# Patient Record
Sex: FEMALE | Race: WHITE | Employment: UNEMPLOYED | URBAN - METROPOLITAN AREA
[De-identification: names, ages, dates, MRNs, and addresses within clinical notes are randomized per-mention and may not be internally consistent; named-entity substitution may affect disease eponyms.]

---

## 2017-03-20 PROBLEM — R56.9 SEIZURE (HCC): Status: ACTIVE | Noted: 2017-03-20

## 2017-03-22 PROBLEM — I25.10 CAD (CORONARY ARTERY DISEASE): Status: ACTIVE | Noted: 2017-03-22

## 2019-07-29 ENCOUNTER — OFFICE VISIT (OUTPATIENT)
Dept: FAMILY MEDICINE CLINIC | Age: 54
End: 2019-07-29

## 2019-07-29 VITALS
SYSTOLIC BLOOD PRESSURE: 133 MMHG | HEART RATE: 72 BPM | RESPIRATION RATE: 20 BRPM | TEMPERATURE: 98.3 F | OXYGEN SATURATION: 93 % | HEIGHT: 64 IN | DIASTOLIC BLOOD PRESSURE: 80 MMHG | BODY MASS INDEX: 27.66 KG/M2 | WEIGHT: 162 LBS

## 2019-07-29 DIAGNOSIS — M54.31 SCIATICA OF RIGHT SIDE: Primary | ICD-10-CM

## 2019-07-29 DIAGNOSIS — E04.1 THYROID NODULE: ICD-10-CM

## 2019-07-29 DIAGNOSIS — M54.41 CHRONIC MIDLINE LOW BACK PAIN WITH RIGHT-SIDED SCIATICA: ICD-10-CM

## 2019-07-29 DIAGNOSIS — G89.29 CHRONIC MIDLINE LOW BACK PAIN WITH RIGHT-SIDED SCIATICA: ICD-10-CM

## 2019-07-29 PROBLEM — J41.0 SIMPLE CHRONIC BRONCHITIS (HCC): Status: ACTIVE | Noted: 2019-07-29

## 2019-07-29 RX ORDER — LIDOCAINE 50 MG/G
PATCH TOPICAL EVERY 24 HOURS
COMMUNITY
End: 2019-09-15

## 2019-07-29 RX ORDER — GABAPENTIN 100 MG/1
CAPSULE ORAL DAILY
COMMUNITY
End: 2020-02-21 | Stop reason: SDUPTHER

## 2019-07-29 RX ORDER — IBUPROFEN 800 MG/1
TABLET ORAL
COMMUNITY
End: 2020-02-21

## 2019-07-29 RX ORDER — CARBAMAZEPINE 200 MG/1
200 TABLET ORAL 3 TIMES DAILY
COMMUNITY
End: 2019-09-15

## 2019-07-29 NOTE — PROGRESS NOTES
Tildon Severin is a 46 y.o. female (: 1965) presenting to address:    Chief Complaint   Patient presents with   [facility] Patient    Back Pain     pain scale 5/10       Vitals:    19 1345   BP: 133/80   Pulse: 72   Resp: 20   Temp: 98.3 °F (36.8 °C)   TempSrc: Oral   SpO2: 93%   Weight: 162 lb (73.5 kg)   Height: 5' 4.17\" (1.63 m)   PainSc:   5   PainLoc: Back       Hearing/Vision:   No exam data present    Learning Assessment:     Learning Assessment 2019   PRIMARY LEARNER Patient   HIGHEST LEVEL OF EDUCATION - PRIMARY LEARNER  DID NOT GRADUATE HIGH SCHOOL   BARRIERS PRIMARY LEARNER NONE   CO-LEARNER CAREGIVER No   PRIMARY LANGUAGE ENGLISH   LEARNER PREFERENCE PRIMARY READING   ANSWERED BY patient    RELATIONSHIP SELF     Depression Screening:     3 most recent PHQ Screens 2019   Little interest or pleasure in doing things Several days   Feeling down, depressed, irritable, or hopeless Several days   Total Score PHQ 2 2     Fall Risk Assessment:   No flowsheet data found. Abuse Screening:     Abuse Screening Questionnaire 2019   Do you ever feel afraid of your partner? N   Are you in a relationship with someone who physically or mentally threatens you? N   Is it safe for you to go home? Y     Coordination of Care Questionaire:   1. Have you been to the ER, urgent care clinic since your last visit? Hospitalized since your last visit? NO    2. Have you seen or consulted any other health care providers outside of the [address] [place] [address] since your last visit? Include any pap smears or colon screening. NO    Advanced Directive:   1. Do you have an Advanced Directive? NO    2. Would you like information on Advanced Directives?  NO

## 2019-08-08 NOTE — PATIENT INSTRUCTIONS
Referral to PT, and orthopedics for back issues ongoing. Referral to cardiology for ongoing thyroid nodule.

## 2019-08-08 NOTE — PROGRESS NOTES
Julia Clay is a 46 y.o.  female and presents with    Chief Complaint   Patient presents with    New Patient    Back Pain     pain scale 5/10         Subjective:  Patient presents to establish care. Patient states come of ongoing back pain times several years. Rates pain 5 out of 10 on pain scale. From the episode in the past of right-sided back pain associated with sciatica. Patient on Neurontin, Lidoderm patches, and Motrin with some success. Patient here today requesting move orthopedics physical therapy. Other concerns today include thyroid nodule, patient would like endocrinology referral for this. States previously seen at care clinic and to speak, and had work-up with endocrinology and treatment but now that she has insurance referral for further treatment. Current Outpatient Medications   Medication Sig Dispense Refill    gabapentin (NEURONTIN) 100 mg capsule Take  by mouth three (3) times daily.  carBAMazepine (TEGRETOL) 200 mg tablet Take 200 mg by mouth three (3) times daily.  lidocaine (LIDODERM) 5 % by TransDERmal route every twenty-four (24) hours. Apply patch to the affected area for 12 hours a day and remove for 12 hours a day.  ibuprofen (MOTRIN) 800 mg tablet Take  by mouth.        Allergies   Allergen Reactions    Adhesive Itching    Iodinated Contrast Media Other (comments)    Pcn [Penicillins] Hives     Past Medical History:   Diagnosis Date    Celiac disease     Pars defect      Past Surgical History:   Procedure Laterality Date    HX APPENDECTOMY      HX  SECTION      HX HYSTERECTOMY       Family History   Problem Relation Age of Onset    Cancer Mother     Diabetes Mother     Hypertension Mother     Diabetes Father     Hypertension Father      Social History     Tobacco Use    Smoking status: Current Every Day Smoker    Smokeless tobacco: Never Used   Substance Use Topics    Alcohol use: Never     Frequency: Never       Review of Systems Constitutional: Negative for chills and fever. HENT: Negative. Eyes: Negative for blurred vision. Respiratory: Negative for shortness of breath. Cardiovascular: Negative for chest pain and leg swelling. Gastrointestinal: Negative for abdominal pain, constipation, diarrhea, nausea and vomiting. Genitourinary: Negative. Musculoskeletal: Positive for back pain and myalgias. Skin: Negative. Neurological: Negative for headaches. Psychiatric/Behavioral: Negative. Objective:  Vitals:    07/29/19 1345   BP: 133/80   Pulse: 72   Resp: 20   Temp: 98.3 °F (36.8 °C)   TempSrc: Oral   SpO2: 93%   Weight: 162 lb (73.5 kg)   Height: 5' 4.17\" (1.63 m)   PainSc:   5   PainLoc: Back     General:   Well-groomed, well-nourished, in no distress, pleasant, alert, appropriate    Neck:      Neck supple, no swelling, mass or tenderness or thyromegaly noted on exam. Unable to palpate neck mass. Cardiovasc:   RRR,  no murmur, rubs or gallops. Pulmonary:    Lungs clear bilaterally, no wheezing, rales or rhonchi. Abdomen:   Abdomen soft, normal bowel sounds. No masses, tenderness,    rebound/rigidity or CVA tenderness. No hepatosplenomegaly. Assessment/Plan:      1. Sciatica of right side  - REFERRAL TO PHYSICAL THERAPY  - REFERRAL TO ORTHOPEDICS    2. Chronic midline low back pain with right-sided sciatica  - REFERRAL TO PHYSICAL THERAPY  - REFERRAL TO ORTHOPEDICS    3. Thyroid nodule  - REFERRAL TO ENDOCRINOLOGY    I have discussed the diagnosis with the patient and the intended plan as seen in the above orders. The patient has received an after-visit summary and questions were answered concerning future plans. I have discussed medication side effects and warnings with the patient as well. I have reviewed the plan of care with the patient, accepted their input and they are in agreement with the treatment goals. Follow-up and Dispositions    · Return in about 3 months (around 10/29/2019). More than 1/2 of this 30 minute visit was spent in counselling and coordination of care, as described above.     Karo Bowden FNP-BC

## 2019-09-18 ENCOUNTER — DOCUMENTATION ONLY (OUTPATIENT)
Dept: FAMILY MEDICINE CLINIC | Age: 54
End: 2019-09-18

## 2019-09-18 ENCOUNTER — OFFICE VISIT (OUTPATIENT)
Dept: FAMILY MEDICINE CLINIC | Age: 54
End: 2019-09-18

## 2019-09-18 VITALS
HEART RATE: 65 BPM | SYSTOLIC BLOOD PRESSURE: 151 MMHG | OXYGEN SATURATION: 97 % | WEIGHT: 154 LBS | DIASTOLIC BLOOD PRESSURE: 85 MMHG | HEIGHT: 65 IN | BODY MASS INDEX: 25.66 KG/M2 | TEMPERATURE: 97.8 F | RESPIRATION RATE: 22 BRPM

## 2019-09-18 DIAGNOSIS — I25.10 CORONARY ARTERY DISEASE INVOLVING NATIVE HEART WITHOUT ANGINA PECTORIS, UNSPECIFIED VESSEL OR LESION TYPE: Primary | ICD-10-CM

## 2019-09-18 DIAGNOSIS — Z72.0 TOBACCO ABUSE: ICD-10-CM

## 2019-09-18 DIAGNOSIS — J44.1 CHRONIC OBSTRUCTIVE PULMONARY DISEASE WITH ACUTE EXACERBATION (HCC): ICD-10-CM

## 2019-09-18 DIAGNOSIS — R07.1 CHEST PAIN ON BREATHING: ICD-10-CM

## 2019-09-18 DIAGNOSIS — M54.2 NECK PAIN: ICD-10-CM

## 2019-09-18 RX ORDER — FLUTICASONE PROPIONATE AND SALMETEROL 250; 50 UG/1; UG/1
1 POWDER RESPIRATORY (INHALATION) EVERY 12 HOURS
Qty: 3 INHALER | Refills: 3 | Status: SHIPPED | OUTPATIENT
Start: 2019-09-18 | End: 2020-02-21

## 2019-09-18 NOTE — PATIENT INSTRUCTIONS
Start taking Robaxin as prescribed by emergency department. Make appointment for vascular as requested by emergency department. Will refer to pulmonology.   See back as needed and in 3 months for touch base

## 2019-09-18 NOTE — PROGRESS NOTES
Robin Sanford is a 46 y.o. female (: 1965) presenting to address:    Chief Complaint   Patient presents with    Follow-up    ED Follow-up     patient went to ER on . patient c/o chest pain and discomfort  she stated she does feel sharp pain ever so often    patient         Vitals:    19 1023   BP: 151/85   Pulse: 65   Resp: 22   Temp: 97.8 °F (36.6 °C)   TempSrc: Oral   SpO2: 97%   Weight: 154 lb (69.9 kg)   Height: 5' 5\" (1.651 m)   PainSc:   6   PainLoc: Generalized       Hearing/Vision:   No exam data present    Learning Assessment:     Learning Assessment 2019   PRIMARY LEARNER Patient   HIGHEST LEVEL OF EDUCATION - PRIMARY LEARNER  DID NOT GRADUATE HIGH SCHOOL   BARRIERS PRIMARY LEARNER NONE   CO-LEARNER CAREGIVER No   PRIMARY LANGUAGE ENGLISH   LEARNER PREFERENCE PRIMARY READING   ANSWERED BY patient    RELATIONSHIP SELF     Depression Screening:     3 most recent PHQ Screens 2019   Little interest or pleasure in doing things Not at all   Feeling down, depressed, irritable, or hopeless Several days   Total Score PHQ 2 1     Fall Risk Assessment:   No flowsheet data found. Abuse Screening:     Abuse Screening Questionnaire 2019   Do you ever feel afraid of your partner? N   Are you in a relationship with someone who physically or mentally threatens you? N   Is it safe for you to go home? Y     Coordination of Care Questionaire:   1. Have you been to the ER, urgent care clinic since your last visit? Hospitalized since your last visit? YES [facility]    2. Have you seen or consulted any other health care providers outside of the [address] [place] [address] since your last visit? Include any pap smears or colon screening. NO    Advanced Directive:   1. Do you have an Advanced Directive? NO    2. Would you like information on Advanced Directives?  NO

## 2019-09-27 NOTE — PROGRESS NOTES
Christa Saeed is a 46 y.o.  female and presents with    Chief Complaint   Patient presents with    Follow-up    ED Follow-up     patient went to ER on Sunday. patient c/o chest pain and discomfort  she stated she does feel sharp pain ever so often    patient           Subjective:  Patient presents after being seen in the emergency department. Patient according to records left AMA. Patient states that she has had ongoing pressure in her neck, and chest wall for some time it worsened that day. ER wanted to admit patient for observation and patient declined. Initial testing was all within normal limits however emergency department stated due to family history, and current patient condition they wanted to monitor. Patient presents today for follow-up with continued symptoms. She did not fill the Robaxin prescription from the ER, she did not make an appointment with vascular per ER recommendation. She also has questions about seeing a pulmonologist due to her diagnosis of COPD. Current Outpatient Medications   Medication Sig Dispense Refill    fluticasone propion-salmeterol (ADVAIR/WIXELA) 250-50 mcg/dose diskus inhaler Take 1 Puff by inhalation every twelve (12) hours. 3 Inhaler 3    hydrOXYzine HCl (ATARAX) 50 mg tablet Take 50 mg by mouth three (3) times daily as needed for Itching.  lidocaine (LIDODERM) 5 % Apply patch to the affected area for 12 hours a day and remove for 12 hours a day. 30 Patch 0    gabapentin (NEURONTIN) 100 mg capsule Take  by mouth daily.  ibuprofen (MOTRIN) 800 mg tablet Take  by mouth.  albuterol (VENTOLIN HFA) 90 mcg/actuation inhaler Take 2 Puffs by inhalation every four (4) hours as needed for Wheezing. 1 Inhaler 0    methocarbamol (ROBAXIN-750) 750 mg tablet Take 1 Tab by mouth four (4) times daily.  20 Tab 0     Allergies   Allergen Reactions    Iodinated Contrast Media Angioedema    Penicillin G Rash and Nausea Only    Adhesive Itching    Aspirin Other (comments)     Patient denies allergy to aspirin    Codeine Nausea and Vomiting    Iodinated Contrast Media Other (comments)    Iodine Rash    Pcn [Penicillins] Hives    Tylenol [Acetaminophen] Hives and Nausea and Vomiting       Review of Systems   Respiratory: Positive for shortness of breath. Negative for cough, sputum production and wheezing. Cardiovascular: Positive for chest pain. Negative for palpitations and leg swelling. Gastrointestinal: Negative for nausea. Musculoskeletal: Positive for neck pain. Neurological: Negative for dizziness and headaches. Objective:  Vitals:    09/18/19 1023   BP: 151/85   Pulse: 65   Resp: 22   Temp: 97.8 °F (36.6 °C)   TempSrc: Oral   SpO2: 97%   Weight: 154 lb (69.9 kg)   Height: 5' 5\" (1.651 m)   PainSc:   6   PainLoc: Generalized     General:   Well-groomed, well-nourished, in no distress, pleasant, alert, appropriate    Cardiovasc:   RRR,  no murmur, rubs or gallops. Pulmonary:    Lungs clear bilaterally, no wheezing, rales or rhonchi. Assessment/Plan:      1. Coronary artery disease involving native heart without angina pectoris, unspecified vessel or lesion type  Start taking Robaxin as prescribed by emergency department. Make appointment for vascular as requested by emergency department. Will refer to pulmonology. See back as needed and in 3 months for touch base     2. Chronic obstructive pulmonary disease with acute exacerbation (HCC)  - REFERRAL TO PULMONARY DISEASE    3. Chest pain on breathing  - REFERRAL TO PULMONARY DISEASE    4. Neck pain  Robaxin. 5. Tobacco abuse  - REFERRAL TO PULMONARY DISEASE    Advised patient straight back to the emergency department with any changes in condition. Gave specific parameters of shortness of breath, chest pain that is increased or changes, diaphoresis, nausea with vomiting. Patient is agreeable to plan.      I have discussed the diagnosis with the patient and the intended plan as seen in the above orders. The patient has received an after-visit summary and questions were answered concerning future plans. I have discussed medication side effects and warnings with the patient as well. I have reviewed the plan of care with the patient, accepted their input and they are in agreement with the treatment goals. Follow-up and Dispositions    · Return in about 3 months (around 12/18/2019). More than 1/2 of this 25 minute visit was spent face to face in counselling and coordination of care, as described above. This document may have been created with the aid of dictation software. Text may contain errors, particularly phonetic errors.      Giorgio Case [facility]-BC

## 2019-09-30 ENCOUNTER — TELEPHONE (OUTPATIENT)
Dept: FAMILY MEDICINE CLINIC | Age: 54
End: 2019-09-30

## 2019-09-30 NOTE — TELEPHONE ENCOUNTER
Pt says she called and left a message last week on the nurses vm about her albuterol inhaler needing a PA. I do not see any notes in her chart from it and the pt is getting concerned about her medication. Please advise and give pt a call back in regards to the status.

## 2019-10-03 RX ORDER — ALBUTEROL SULFATE 90 UG/1
2 AEROSOL, METERED RESPIRATORY (INHALATION)
Qty: 1 INHALER | Refills: 11 | Status: SHIPPED | OUTPATIENT
Start: 2019-10-03 | End: 2020-02-21 | Stop reason: SDUPTHER

## 2019-10-03 NOTE — TELEPHONE ENCOUNTER
Prior Authorization Request # [id number] for Albuterol was denied  Preferred drugs are Proair HFA, or Proventil HFA

## 2019-10-04 ENCOUNTER — TELEPHONE (OUTPATIENT)
Dept: FAMILY MEDICINE CLINIC | Age: 54
End: 2019-10-04

## 2019-10-04 NOTE — TELEPHONE ENCOUNTER
Patient wanted medication sent to [facility] [place]. I called and left message for refill on pharmacy refill line  I did inform patient as well.

## 2019-10-04 NOTE — TELEPHONE ENCOUNTER
Patient called saying that albuterol inhaler is not covered by insurance but the inhaler that was suppose to be sent to the pharmacy was not sent.  Please advise

## 2020-02-20 NOTE — PROGRESS NOTES
HISTORY OF PRESENT ILLNESS  Mary Britt is a 53 y.o. female. Ms. Patricia Amaya presents to establish care and for follow-up of health problems including multiple cardiac risk factors, COPD, lumbar disc disease with radiculopathy, celiac disease, family history of colon cancer in her mother, nicotine dependence    Mr#: [id number]      Past Medical History:   Diagnosis Date    [facility]     Celiac disease     Chronic obstructive pulmonary disease ([facility]     Colon polyps     Endocrine disease     Heart attack ([facility]     2017    Hypertension     Pars defect     Seizure ([facility]     last seizure 10 years ago per patient.  it was a grand mal seizure    Seizures ([facility]        Past Surgical History:   Procedure Laterality Date    COLONOSCOPY N/A 7/6/2017    COLONOSCOPY, SURVEILLANCE with Bxs performed by Tray Freitas MD at [address] [place] [address] HX APPENDECTOMY       [address] HX COLONOSCOPY      HX ENDOSCOPY      HX HYSTERECTOMY      HX LAPAROTOMY      HX BULMARO AND BSO      HX TONSILLECTOMY         Family History   Problem Relation Age of Onset    Cancer Mother     Diabetes Mother     Hypertension Mother     Colon Cancer Mother     Heart Attack Mother     Diabetes Father     Hypertension Father     Heart Disease Father     Heart Attack Father     Heart Disease Sister     Heart Attack Sister     Diabetes Sister     Diabetes Maternal Grandmother     Heart Attack Maternal Grandmother     Diabetes Maternal Grandfather     Diabetes Paternal Grandmother     Diabetes Paternal Grandfather        Allergies   Allergen Reactions    Iodinated Contrast Media Angioedema    Penicillin G Rash and Nausea Only    Adhesive Itching    Aspirin Other (comments)     Patient denies allergy to aspirin    Codeine Nausea and Vomiting    Iodinated Contrast Media Other (comments)    Iodine Rash    Pcn [Penicillins] Hives    Tylenol [Acetaminophen] Hives and Nausea and Vomiting       Social History     Tobacco Use   Smoking Status Current Every Day Smoker    Packs/day: 1.00    Years: 15.00    Pack years: 15.00   Smokeless Tobacco Never Used       Social History     Substance and Sexual Activity   Alcohol Use Never    Frequency: Never           Health Maintenance Review:  Tetanus immunization - 5-6 years ago per patient  Influenza immunization - declines  Pap smear - N/A  Mammogram - due, will schedule  Colonoscopy - 2 years ago Dr. Thais Cee    Patient Active Problem List   Diagnosis Code    Seizure ([facility] R56.9    CAD (coronary artery disease) I25.10    Simple chronic bronchitis ([facility] J41.0    Thyroid nodule E04.1    Sciatica of right side M54.31    Chronic obstructive pulmonary disease with acute exacerbation (HCC) J44.1    Neck pain M54.2    Tobacco abuse Z72.0         Current Outpatient Medications:     albuterol (PROVENTIL HFA, VENTOLIN HFA, PROAIR HFA) 90 mcg/actuation inhaler, Take 2 Puffs by inhalation every four (4) hours as needed for Wheezing., Disp: 1 Inhaler, Rfl: 11    fluticasone propion-salmeterol (ADVAIR/WIXELA) 250-50 mcg/dose diskus inhaler, Take 1 Puff by inhalation every twelve (12) hours. , Disp: 3 Inhaler, Rfl: 3    hydrOXYzine HCl (ATARAX) 50 mg tablet, Take 50 mg by mouth three (3) times daily as needed for Itching., Disp: , Rfl:     methocarbamol (ROBAXIN-750) 750 mg tablet, Take 1 Tab by mouth four (4) times daily. , Disp: 20 Tab, Rfl: 0    lidocaine (LIDODERM) 5 %, Apply patch to the affected area for 12 hours a day and remove for 12 hours a day., Disp: 30 Patch, Rfl: 0    gabapentin (NEURONTIN) 100 mg capsule, Take  by mouth daily. , Disp: , Rfl:     ibuprofen (MOTRIN) 800 mg tablet, Take  by mouth., Disp: , Rfl:          Review of Systems   Constitutional: Negative for chills, fever and weight loss. HENT: Negative for congestion, hearing loss and sore throat. Eyes: Negative for blurred vision and double vision.         Wears corrective lenses Respiratory: Negative for cough, shortness of breath and wheezing. Sees a pulmonary consultant for COPD, uses Trilegy, albuteral   Cardiovascular: Negative for chest pain, palpitations and leg swelling. Seeing cardiology because of ED visit with chest pain and risk factors   Gastrointestinal: Negative for abdominal pain, constipation, diarrhea, heartburn, nausea and vomiting. Followed for Celiac Dz, Fam Hx colon CA, mother   Genitourinary: Negative for dysuria and urgency. Musculoskeletal: Positive for back pain (lumbar disc disease-advised to use cane, walker). Negative for joint pain and myalgias. Skin: Negative for itching and rash. Lichen planus followed by dermatology   Neurological: Positive for sensory change (numbness right leg) and headaches (sometimes). Negative for dizziness, tingling and focal weakness. Endo/Heme/Allergies: Negative for environmental allergies. Psychiatric/Behavioral: Negative for depression and suicidal ideas. The patient is not nervous/anxious and does not have insomnia. Visit Vitals  /74 (BP 1 Location: Left arm, BP Patient Position: Sitting)   Pulse 70   Temp 97.9 °F (36.6 °C) (Oral)   Resp 15   Ht 5' 5\" (1.651 m)   Wt 156 lb 9.6 oz (71 kg)   SpO2 98%   BMI 26.06 kg/m²       Physical Exam  Vitals signs and nursing note reviewed. Constitutional:       Appearance: Normal appearance. HENT:      Head: Normocephalic. Right Ear: Tympanic membrane, ear canal and external ear normal.      Left Ear: Tympanic membrane, ear canal and external ear normal.      Mouth/Throat:      Mouth: Mucous membranes are moist.      Pharynx: Oropharynx is clear. Eyes:      Extraocular Movements: Extraocular movements intact. Conjunctiva/sclera: Conjunctivae normal.      Pupils: Pupils are equal, round, and reactive to light. Neck:      Musculoskeletal: Neck supple. Vascular: No carotid bruit.    Cardiovascular:      Rate and Rhythm: Normal rate and regular rhythm. Heart sounds: Normal heart sounds. Pulmonary:      Effort: Pulmonary effort is normal.      Breath sounds: Normal breath sounds. Abdominal:      Palpations: Abdomen is soft. Tenderness: There is no abdominal tenderness. Musculoskeletal:         General: No deformity. Right lower leg: No edema. Left lower leg: No edema. Skin:     General: Skin is warm and dry. Neurological:      General: No focal deficit present. Mental Status: She is alert and oriented to person, place, and time. Psychiatric:         Mood and Affect: Mood normal.         Behavior: Behavior normal.         ASSESSMENT and PLAN    ICD-10-CM ICD-9-CM    1. Chronic obstructive pulmonary disease with acute exacerbation (HCC) J44.1 491.21 CBC WITH AUTOMATED DIFF      METABOLIC PANEL, BASIC      albuterol (PROVENTIL HFA, VENTOLIN HFA, PROAIR HFA) 90 mcg/actuation inhaler   2. Lumbar disc disease with radiculopathy M51.16 722.10 lidocaine (LIDODERM) 5 %     724.4 gabapentin (NEURONTIN) 100 mg capsule   3. Family history of premature coronary artery disease Z82.49 V17.3    4. Hypercholesterolemia E78.00 272.0 CBC WITH AUTOMATED DIFF      LIPID PANEL      METABOLIC PANEL, BASIC   5. Tobacco abuse Z72.0 305.1 CBC WITH AUTOMATED DIFF   6. Family history of colon cancer Z80.0 V16.0 CBC WITH AUTOMATED DIFF   7. Breast cancer screening Z12.39 V76.10 GENNY MAMMO BI SCREENING INCL CAD   8. Chronic anxiety F41.9 300.00 CBC WITH AUTOMATED DIFF      TSH 3RD GENERATION     Continue current medications  Lab today further disposition pending lab results  Avoid dietary starch and sugar  Work on smoking cessation by decreasing by 1 cigarette/week  Mammogram ordered  Return for follow-up in 6 months, physical exam will apparently be due then, follow-up sooner with any problems    PLEASE NOTE:   This document has been produced using voice recognition software. Unrecognized errors in transcription may be present.

## 2020-02-21 ENCOUNTER — OFFICE VISIT (OUTPATIENT)
Dept: FAMILY MEDICINE CLINIC | Age: 55
End: 2020-02-21

## 2020-02-21 ENCOUNTER — LAB ONLY (OUTPATIENT)
Dept: FAMILY MEDICINE CLINIC | Age: 55
End: 2020-02-21

## 2020-02-21 VITALS
RESPIRATION RATE: 15 BRPM | BODY MASS INDEX: 26.09 KG/M2 | TEMPERATURE: 97.9 F | DIASTOLIC BLOOD PRESSURE: 74 MMHG | HEART RATE: 70 BPM | WEIGHT: 156.6 LBS | OXYGEN SATURATION: 98 % | HEIGHT: 65 IN | SYSTOLIC BLOOD PRESSURE: 110 MMHG

## 2020-02-21 DIAGNOSIS — J44.1 CHRONIC OBSTRUCTIVE PULMONARY DISEASE WITH ACUTE EXACERBATION (HCC): Primary | ICD-10-CM

## 2020-02-21 DIAGNOSIS — E78.00 HYPERCHOLESTEROLEMIA: ICD-10-CM

## 2020-02-21 DIAGNOSIS — Z82.49 FAMILY HISTORY OF PREMATURE CORONARY ARTERY DISEASE: ICD-10-CM

## 2020-02-21 DIAGNOSIS — F41.9 CHRONIC ANXIETY: ICD-10-CM

## 2020-02-21 DIAGNOSIS — Z80.0 FAMILY HISTORY OF COLON CANCER: ICD-10-CM

## 2020-02-21 DIAGNOSIS — Z12.39 BREAST CANCER SCREENING: ICD-10-CM

## 2020-02-21 DIAGNOSIS — Z72.0 TOBACCO ABUSE: ICD-10-CM

## 2020-02-21 DIAGNOSIS — J44.1 CHRONIC OBSTRUCTIVE PULMONARY DISEASE WITH ACUTE EXACERBATION (HCC): ICD-10-CM

## 2020-02-21 DIAGNOSIS — M51.16 LUMBAR DISC DISEASE WITH RADICULOPATHY: ICD-10-CM

## 2020-02-21 RX ORDER — ALBUTEROL SULFATE 90 UG/1
2 AEROSOL, METERED RESPIRATORY (INHALATION)
Qty: 1 INHALER | Refills: 12 | Status: SHIPPED | OUTPATIENT
Start: 2020-02-21 | End: 2021-10-26 | Stop reason: SDUPTHER

## 2020-02-21 RX ORDER — LIDOCAINE 50 MG/G
PATCH TOPICAL
Qty: 30 PATCH | Refills: 12 | Status: SHIPPED | OUTPATIENT
Start: 2020-02-21 | End: 2021-09-14 | Stop reason: SDUPTHER

## 2020-02-21 RX ORDER — FLUTICASONE FUROATE, UMECLIDINIUM BROMIDE AND VILANTEROL TRIFENATATE 100; 62.5; 25 UG/1; UG/1; UG/1
POWDER RESPIRATORY (INHALATION)
COMMUNITY
Start: 2020-02-06

## 2020-02-21 RX ORDER — GABAPENTIN 100 MG/1
100 CAPSULE ORAL 3 TIMES DAILY
Qty: 90 CAP | Refills: 1 | Status: SHIPPED | OUTPATIENT
Start: 2020-02-21 | End: 2020-06-26 | Stop reason: SDUPTHER

## 2020-02-21 RX ORDER — ATORVASTATIN CALCIUM 40 MG/1
40 TABLET, FILM COATED ORAL
COMMUNITY
Start: 2020-02-05 | End: 2020-09-20 | Stop reason: DRUGHIGH

## 2020-02-21 NOTE — PROGRESS NOTES
Aleksandr Villafana is a 53 y.o. female (: 1965) presenting to address:    Chief Complaint   Patient presents with   [facility]     needs letter stating she can have a  animal; declined flu vaccine    Celiac     wants to know if it goes away       Vitals:    20 0715   BP: 110/74   Pulse: 70   Resp: 15   Temp: 97.9 °F (36.6 °C)   TempSrc: Oral   SpO2: 98%   Weight: 156 lb 9.6 oz (71 kg)   Height: 5' 5\" (1.651 m)   PainSc:   0 - No pain       Hearing/Vision:   No exam data present    Learning Assessment:     Learning Assessment 2019   PRIMARY LEARNER Patient   HIGHEST LEVEL OF EDUCATION - PRIMARY LEARNER  DID NOT GRADUATE HIGH SCHOOL   BARRIERS PRIMARY LEARNER NONE   CO-LEARNER CAREGIVER No   PRIMARY LANGUAGE ENGLISH   LEARNER PREFERENCE PRIMARY READING   ANSWERED BY patient    RELATIONSHIP SELF     Depression Screening:     3 most recent PHQ Screens 2020   Little interest or pleasure in doing things Not at all   Feeling down, depressed, irritable, or hopeless Not at all   Total Score PHQ 2 0     Fall Risk Assessment:   No flowsheet data found. Abuse Screening:     Abuse Screening Questionnaire 2019   Do you ever feel afraid of your partner? N   Are you in a relationship with someone who physically or mentally threatens you? N   Is it safe for you to go home? Y     Coordination of Care Questionaire:   1. Have you been to the ER, urgent care clinic since your last visit? Hospitalized since your last visit? NO    2. Have you seen or consulted any other health care providers outside of the [address] [place] [address] since your last visit? Include any pap smears or colon screening. YES, pulmonary; cardiology; dermatology    Advanced Directive:   1. Do you have an Advanced Directive? NO    2. Would you like information on Advanced Directives? NO    Patient DECLINED flu vaccine.

## 2020-02-21 NOTE — LETTER
2/21/2020 7:50 AM 
 
Ms. Alyssa Madison 
[address] [facility] [address] To Whom It May Concern: 
 
Alyssa Todd is currently under the care of [address] [place] [address]. Please allow her to have a support animal in her home If there are questions or concerns please have the patient contact our office. Sincerely, Alyssa Rea MD

## 2020-02-22 LAB
BASOPHILS # BLD AUTO: 0.1 X10E3/UL (ref 0–0.2)
BASOPHILS NFR BLD AUTO: 1 %
BUN SERPL-MCNC: 16 MG/DL (ref 6–24)
BUN/CREAT SERPL: 21 (ref 9–23)
CALCIUM SERPL-MCNC: 9.7 MG/DL (ref 8.7–10.2)
CHLORIDE SERPL-SCNC: 101 MMOL/L (ref 96–106)
CHOLEST SERPL-MCNC: 241 MG/DL (ref 100–199)
CO2 SERPL-SCNC: 21 MMOL/L (ref 20–29)
CREAT SERPL-MCNC: 0.76 MG/DL (ref 0.57–1)
EOSINOPHIL # BLD AUTO: 0.2 X10E3/UL (ref 0–0.4)
EOSINOPHIL NFR BLD AUTO: 3 %
ERYTHROCYTE [DISTWIDTH] IN BLOOD BY AUTOMATED COUNT: 12.5 % (ref 11.7–15.4)
GLUCOSE SERPL-MCNC: 81 MG/DL (ref 65–99)
HCT VFR BLD AUTO: 47.7 % (ref 34–46.6)
HDLC SERPL-MCNC: 42 MG/DL
HGB BLD-MCNC: 16.3 G/DL (ref 11.1–15.9)
IMM GRANULOCYTES # BLD AUTO: 0 X10E3/UL (ref 0–0.1)
IMM GRANULOCYTES NFR BLD AUTO: 0 %
INTERPRETATION, 910389: NORMAL
LDLC SERPL CALC-MCNC: 179 MG/DL (ref 0–99)
LYMPHOCYTES # BLD AUTO: 2.3 X10E3/UL (ref 0.7–3.1)
LYMPHOCYTES NFR BLD AUTO: 29 %
MCH RBC QN AUTO: 31 PG (ref 26.6–33)
MCHC RBC AUTO-ENTMCNC: 34.2 G/DL (ref 31.5–35.7)
MCV RBC AUTO: 91 FL (ref 79–97)
MONOCYTES # BLD AUTO: 0.7 X10E3/UL (ref 0.1–0.9)
MONOCYTES NFR BLD AUTO: 9 %
NEUTROPHILS # BLD AUTO: 4.5 X10E3/UL (ref 1.4–7)
NEUTROPHILS NFR BLD AUTO: 58 %
PLATELET # BLD AUTO: 327 X10E3/UL (ref 150–450)
POTASSIUM SERPL-SCNC: 4.3 MMOL/L (ref 3.5–5.2)
RBC # BLD AUTO: 5.26 X10E6/UL (ref 3.77–5.28)
SODIUM SERPL-SCNC: 141 MMOL/L (ref 134–144)
TRIGL SERPL-MCNC: 102 MG/DL (ref 0–149)
TSH SERPL DL<=0.005 MIU/L-ACNC: 1.15 UIU/ML (ref 0.45–4.5)
VLDLC SERPL CALC-MCNC: 20 MG/DL (ref 5–40)
WBC # BLD AUTO: 7.8 X10E3/UL (ref 3.4–10.8)

## 2020-02-22 NOTE — PROGRESS NOTES
Please advise that lab results show cholesterol levels are still elevated but not substantially changed from last year. Her hemoglobin and hematocrit which were extremely elevated last year are still above the normal range but not as high as they were then. This is most likely caused by smoking and does increase her risk of heart attack and stroke.

## 2020-02-24 DIAGNOSIS — Z72.0 TOBACCO ABUSE: Primary | ICD-10-CM

## 2020-02-24 RX ORDER — IBUPROFEN 200 MG
1 TABLET ORAL EVERY 24 HOURS
Qty: 30 PATCH | Refills: 0 | Status: SHIPPED | OUTPATIENT
Start: 2020-02-24 | End: 2020-03-24 | Stop reason: SDUPTHER

## 2020-02-24 NOTE — PROGRESS NOTES
Please advise Ms. Kasperalan Amelia that a prescription for NicoDerm CQ patches has been sent to her pharmacy. It is very important that she not smoke and wear the patch at the same time. The current prescription is for the highest dose patch. This should be used for 30 days and then we can send a prescription for the medium dose patch for another 30 days if she would like to continue. Please ask her to let us know.

## 2020-02-28 ENCOUNTER — TELEPHONE (OUTPATIENT)
Dept: FAMILY MEDICINE CLINIC | Age: 55
End: 2020-02-28

## 2020-02-28 NOTE — TELEPHONE ENCOUNTER
Received call from pt and she went to [facility]; diagnosed w/staphylococcus aureus bacteria located on left arm/wrist line to forearm; was given keflex 500 mg BID for 5 days; is this contagious, should she not be near her family members/friends; was advised to wear mask in public and to f/u w/PCP. elaine

## 2020-02-28 NOTE — TELEPHONE ENCOUNTER
Emergency department note reviewed, actually there has been no diagnosis of a specific bacterial cause of the infection. She may have been told that these infections are usually caused by staph or strep. Since she has no open wound it would not have been possible to obtain a wound culture. Also it is much less likely that she is contagious without an open wound. Nonetheless it is always best to wash the hands carefully and avoid unnecessary physical contact with others. This infection would not spread via respiratory droplets and wearing a mask would have no impact on it 1 way or the other.

## 2020-03-24 DIAGNOSIS — Z72.0 TOBACCO ABUSE: ICD-10-CM

## 2020-03-24 NOTE — TELEPHONE ENCOUNTER
Patient is calling for step 2 of her nicotine cessation patches. Requested Prescriptions     Pending Prescriptions Disp Refills    nicotine (NICODERM CQ) 21 mg/24 hr 30 Patch 0     Si Patch by TransDERmal route every twenty-four (24) hours for 30 days.

## 2020-03-25 RX ORDER — IBUPROFEN 200 MG
1 TABLET ORAL EVERY 24 HOURS
Qty: 30 PATCH | Refills: 0 | Status: SHIPPED | OUTPATIENT
Start: 2020-03-25 | End: 2020-04-23 | Stop reason: DRUGHIGH

## 2020-04-23 ENCOUNTER — VIRTUAL VISIT (OUTPATIENT)
Dept: FAMILY MEDICINE CLINIC | Age: 55
End: 2020-04-23

## 2020-04-23 DIAGNOSIS — R55 VASOVAGAL SYNCOPE: ICD-10-CM

## 2020-04-23 DIAGNOSIS — Z72.0 TOBACCO ABUSE: ICD-10-CM

## 2020-04-23 DIAGNOSIS — K52.9 GASTROENTERITIS: Primary | ICD-10-CM

## 2020-04-23 DIAGNOSIS — B34.9 HEADACHE DUE TO VIRAL INFECTION: ICD-10-CM

## 2020-04-23 DIAGNOSIS — R51.9 HEADACHE DUE TO VIRAL INFECTION: ICD-10-CM

## 2020-04-23 RX ORDER — NICOTINE 21-14-7MG
KIT TRANSDERMAL
Qty: 30 PATCH | Refills: 0 | Status: SHIPPED | OUTPATIENT
Start: 2020-04-23 | End: 2020-06-26 | Stop reason: SDUPTHER

## 2020-04-23 NOTE — PROGRESS NOTES
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 me on 4/23/2020. Mr#: [id number]    Consent:  She and/or her healthcare decision maker is aware that this patient-initiated Telehealth encounter is a billable service, with coverage as determined by her insurance carrier. She is aware that she may receive a bill and has provided verbal consent to proceed: YES    I was in the office while conducting this encounter. 712  HPI/Subjective:     She presents for follow-up after abrupt onset of vomiting diarrhea with loss of consciousness resulting in transport to [facility] emergency department on 4/21/2020:     Emergency department triage note:  Alessandra Padillaber in by EMS for nausea, vomiting and diarrhea, abdominal cramping and headache since this afternoon. Had a syncopal episode. Patient states that she was placed in the emergency department waiting room where after waiting 3 hours she decided to go home without being seen. She reports the abrupt onset of nausea vomiting and diarrhea on the afternoon of 4/21/2020 with a subsequent episode of apparent syncope. She reports that she ate the same food as other family members who had no similar symptoms and she is not aware of any contacts or family members with similar symptoms. She acknowledges fairly frequent episodes of \"fainting\" in the past.  She reports a persistent headache which has slowly been improving. She indicates that she has had no vomiting for 48 hours and had only a small amount of diarrhea yesterday but none in 24 hours. She denies fever or shortness of breath or cough.     Patient Active Problem List   Diagnosis Code    Seizure ([facility] [address] R56.9    CAD (coronary artery disease) I25.10    Simple chronic bronchitis (HCC) J41.0    Thyroid nodule E04.1    Sciatica of right side M54.31    Chronic obstructive pulmonary disease with acute exacerbation (HCC) J44.1    Neck pain M54.2    Tobacco abuse Z72.0         Current Outpatient Medications:     nicotine (NICODERM CQ) 21 mg/24 hr, 1 Patch by TransDERmal route every twenty-four (24) hours for 30 days. , Disp: 30 Patch, Rfl: 0    TRELEGY ELLIPTA 100-62.5-25 mcg inhaler, INHALE 1 PUFF BY MOUTH ONCE DAILY RINSE AND GARGLE AFTER USE, Disp: , Rfl:     atorvastatin (LIPITOR) 40 mg tablet, 40 mg., Disp: , Rfl:     lidocaine (LIDODERM) 5 %, Apply patch to the affected area for 12 hours a day and remove for 12 hours a day., Disp: 30 Patch, Rfl: 12    gabapentin (NEURONTIN) 100 mg capsule, Take 1 Cap by mouth three (3) times daily. Max Daily Amount: 300 mg., Disp: 90 Cap, Rfl: 1    albuterol (PROVENTIL HFA, VENTOLIN HFA, PROAIR HFA) 90 mcg/actuation inhaler, Take 2 Puffs by inhalation every four (4) hours as needed for Wheezing., Disp: 1 Inhaler, Rfl: 12    hydrOXYzine HCl (ATARAX) 50 mg tablet, Take 50 mg by mouth three (3) times daily as needed for Itching., Disp: , Rfl:      Allergies   Allergen Reactions    Iodinated Contrast Media Angioedema    Penicillin G Rash and Nausea Only    Adhesive Itching    Aspirin Other (comments)     Patient denies allergy to aspirin    Codeine Nausea and Vomiting    Iodinated Contrast Media Other (comments)    Iodine Rash    Pcn [Penicillins] Hives    Tylenol [Acetaminophen] Hives and Nausea and Vomiting         Review of Systems   Constitutional: Negative for fever. Respiratory: Negative for cough and shortness of breath. History of COPD, no reported exacerbation of symptoms   Cardiovascular: Negative for chest pain and palpitations. Gastrointestinal: Positive for diarrhea, nausea and vomiting. Negative for abdominal pain, blood in stool, constipation and melena. See HPI, symptoms now resolved   Genitourinary: Negative for dysuria and urgency. Skin: Negative for rash. Neurological: Positive for loss of consciousness and headaches.         The HPI regarding the symptoms   Psychiatric/Behavioral: Negative for depression. Reports that she has greatly reduced her cigarette smoking using 21 mg nicotine patches and in a week will be ready to transition down to 14 mg patches         PHYSICAL EXAMINATION:    Constitutional: [x] Appears well-developed and well-nourished [x] No apparent distress        Mental status: [x] Alert and awake  [x] Oriented t [x] Able to follow commands      Eyes:   EOM    [x]  Normal        HENT: [x] Normocephalic,      Pulmonary/Chest: [x] Respiratory effort normal   [x] No visualized signs of difficulty breathing or respiratory distress           Musculoskeletal:   [x] No obvious deformities noted with regard to areas viewed           Neurological:        [x] No apparent neurologic deficits noted in areas viewed                 Skin:        [x] No apparent dermatologic abnormalities noted in areas viewed               Psychiatric:       [x] Normal Affect      Other pertinent observable physical exam findings:-None noted    Assessment & Plan:   Diagnoses and all orders for this visit:    1. Gastroenteritis    2. Vasovagal syncope    3. Headache due to viral infection    4. Tobacco abuse  -     Nicotine 21-14-7 mg/24 hr ptds; Apply 1 patch daily, change every 24 hours    Maintain hydration with small amounts of fluid frequently. Avoid citrus, dairy, caffeine, tomato, alcohol and NSAIDs. Advance the diet by including bland foods like toast, oatmeal, eggs, soup etc. As soon as possible. Begin application of 14 mg nicotine patches to be changed every 24 hours as soon as the 30-day course of 21 mg patches has been completed. I advised her to contact the office if her condition worsens, changes, fails to improve as anticipated and with any new problems. She expressed understanding with the diagnosis(es) and plan.      [facility] the patient is at home and the provider is at [facility] and Bianca Buck LPN assisted with the telehealth visit. Pursuant to the emergency declaration under the [facility], [facility] waiver authority and the [employer] Act, this Virtual  Visit was conducted, with patient's consent, to reduce the patient's risk of exposure to COVID-19 and provide continuity of care for an established patient. Services were provided through a video synchronous discussion virtually to substitute for in-person clinic visit. Shay Casillas MD         PLEASE NOTE:   This document has been produced using voice recognition software. Unrecognized errors in transcription may be present.

## 2020-04-23 NOTE — PROGRESS NOTES
Chio Pan is a 53 y.o. female (: 1965) presenting to address:    Chief Complaint   Patient presents with    ED Follow-up       Vitals:    20 [id number]   PainSc:   9   PainLoc: Head       Hearing/Vision:   No exam data present    Learning Assessment:     Learning Assessment 2019   PRIMARY LEARNER Patient   HIGHEST LEVEL OF EDUCATION - PRIMARY LEARNER  DID NOT GRADUATE HIGH SCHOOL   BARRIERS PRIMARY LEARNER NONE   CO-LEARNER CAREGIVER No   PRIMARY LANGUAGE ENGLISH   LEARNER PREFERENCE PRIMARY READING   ANSWERED BY patient    RELATIONSHIP SELF     Depression Screening:     3 most recent PHQ Screens 2020   Little interest or pleasure in doing things Not at all   Feeling down, depressed, irritable, or hopeless Not at all   Total Score PHQ 2 0     Fall Risk Assessment:   No flowsheet data found. Abuse Screening:     Abuse Screening Questionnaire 2019   Do you ever feel afraid of your partner? N   Are you in a relationship with someone who physically or mentally threatens you? N   Is it safe for you to go home? Y     Coordination of Care Questionaire:   1. Have you been to the ER, urgent care clinic since your last visit? Hospitalized since your last visit? YES, [address]    2. Have you seen or consulted any other health care providers outside of the [address] [place] [address] since your last visit? Include any pap smears or colon screening. YES, endocrine    Advanced Directive:   1. Do you have an Advanced Directive? YES    2. Would you like information on Advanced Directives?  NO

## 2020-04-27 ENCOUNTER — TELEPHONE (OUTPATIENT)
Dept: FAMILY MEDICINE CLINIC | Age: 55
End: 2020-04-27

## 2020-06-26 ENCOUNTER — DOCUMENTATION ONLY (OUTPATIENT)
Dept: FAMILY MEDICINE CLINIC | Age: 55
End: 2020-06-26

## 2020-06-26 DIAGNOSIS — Z72.0 TOBACCO ABUSE: ICD-10-CM

## 2020-06-26 DIAGNOSIS — M51.16 LUMBAR DISC DISEASE WITH RADICULOPATHY: ICD-10-CM

## 2020-06-26 RX ORDER — NICOTINE 21-14-7MG
14 KIT TRANSDERMAL DAILY
Qty: 30 PATCH | Refills: 0 | Status: SHIPPED | OUTPATIENT
Start: 2020-06-26 | End: 2021-09-14

## 2020-06-26 RX ORDER — GABAPENTIN 100 MG/1
100 CAPSULE ORAL 3 TIMES DAILY
Qty: 90 CAP | Refills: 2 | Status: SHIPPED | OUTPATIENT
Start: 2020-06-26 | End: 2020-09-18 | Stop reason: SDUPTHER

## 2020-09-18 ENCOUNTER — OFFICE VISIT (OUTPATIENT)
Dept: FAMILY MEDICINE CLINIC | Age: 55
End: 2020-09-18

## 2020-09-18 VITALS
DIASTOLIC BLOOD PRESSURE: 70 MMHG | SYSTOLIC BLOOD PRESSURE: 112 MMHG | RESPIRATION RATE: 14 BRPM | HEIGHT: 65 IN | TEMPERATURE: 97.2 F | WEIGHT: 151.2 LBS | OXYGEN SATURATION: 92 % | HEART RATE: 62 BPM | BODY MASS INDEX: 25.19 KG/M2

## 2020-09-18 DIAGNOSIS — Z72.0 TOBACCO ABUSE: ICD-10-CM

## 2020-09-18 DIAGNOSIS — E78.00 HYPERCHOLESTEROLEMIA: ICD-10-CM

## 2020-09-18 DIAGNOSIS — Z11.59 NEED FOR HEPATITIS C SCREENING TEST: ICD-10-CM

## 2020-09-18 DIAGNOSIS — G62.9 NEUROPATHY: ICD-10-CM

## 2020-09-18 DIAGNOSIS — Z86.010 HISTORY OF ADENOMATOUS POLYP OF COLON: ICD-10-CM

## 2020-09-18 DIAGNOSIS — Z80.0 FAMILY HISTORY OF COLON CANCER: ICD-10-CM

## 2020-09-18 DIAGNOSIS — J44.1 CHRONIC OBSTRUCTIVE PULMONARY DISEASE WITH ACUTE EXACERBATION (HCC): ICD-10-CM

## 2020-09-18 DIAGNOSIS — F41.9 CHRONIC ANXIETY: ICD-10-CM

## 2020-09-18 DIAGNOSIS — M51.16 LUMBAR DISC DISEASE WITH RADICULOPATHY: ICD-10-CM

## 2020-09-18 DIAGNOSIS — Z00.00 ROUTINE GENERAL MEDICAL EXAMINATION AT A HEALTH CARE FACILITY: Primary | ICD-10-CM

## 2020-09-18 RX ORDER — GABAPENTIN 100 MG/1
100 CAPSULE ORAL 3 TIMES DAILY
Qty: 90 CAP | Refills: 5 | Status: SHIPPED | OUTPATIENT
Start: 2020-09-18 | End: 2021-09-14 | Stop reason: DRUGHIGH

## 2020-09-18 NOTE — PROGRESS NOTES
HISTORY OF PRESENT ILLNESS  Chu Hastings is a 53 y.o. female. She presents for health assessment, preventative care and follow-up with a history of coronary artery disease, COPD and tobacco abuse, hyperlipidemia, lumbar disc disease and tubular adenoma the colon. Today she reports concern about episodic pain and numbness in the second and third toes of the left foot. She reports chronic back pain associated with lumbar disc disease which greatly limits her ability to ambulate. She requests a handicap parking application. She reports that she had stopped smoking but then started back again, she now reports that she is smoking about 10 cigarettes a week. Review of Systems   Constitutional: Negative for chills, fever and weight loss. HENT: Negative for congestion, hearing loss and sore throat. Eyes: Negative for blurred vision and double vision. Respiratory: Negative for cough, shortness of breath and wheezing. Cardiovascular: Negative for chest pain, palpitations and leg swelling. Gastrointestinal: Negative for abdominal pain, constipation, diarrhea, heartburn, nausea and vomiting. Genitourinary: Negative for dysuria and urgency. Musculoskeletal: Positive for back pain (chronic). Negative for joint pain and myalgias. Skin: Negative for itching and rash. Lichen planus   Neurological: Negative for dizziness, tingling, sensory change, focal weakness and headaches. Pain and numbness , episodic left 2nd and 3rd toes x 6 months   Endo/Heme/Allergies: Negative for environmental allergies. Psychiatric/Behavioral: Negative for depression. The patient is not nervous/anxious and does not have insomnia. Visit Vitals  /70 (BP 1 Location: Left arm, BP Patient Position: Sitting)   Pulse 62   Temp 97.2 °F (36.2 °C) (Temporal)   Resp 14   Ht 5' 5\" (1.651 m)   Wt 151 lb 3.2 oz (68.6 kg)   SpO2 92%   BMI 25.16 kg/m²       Physical Exam  Vitals signs and nursing note reviewed. Constitutional:       General: She is not in acute distress. Appearance: Normal appearance. She is normal weight. She is not ill-appearing. HENT:      Head: Normocephalic. Right Ear: Tympanic membrane, ear canal and external ear normal.      Left Ear: Tympanic membrane, ear canal and external ear normal.   Eyes:      Extraocular Movements: Extraocular movements intact. Conjunctiva/sclera: Conjunctivae normal.      Pupils: Pupils are equal, round, and reactive to light. Neck:      Musculoskeletal: Neck supple. Vascular: No carotid bruit. Cardiovascular:      Rate and Rhythm: Normal rate and regular rhythm. Heart sounds: Normal heart sounds. Pulmonary:      Effort: Pulmonary effort is normal.      Breath sounds: Normal breath sounds. Abdominal:      Palpations: Abdomen is soft. Tenderness: There is no abdominal tenderness. Musculoskeletal:         General: Tenderness ( Tenderness distal left second and third toes at the tips) present. No swelling or deformity. Right lower leg: No edema. Left lower leg: No edema. Comments: Dorsal pedal and posterior tibial pulses not palpable in the left foot however the foot is warm with brisk capillary refill and there is no ischemic discoloration involving the foot particularly the left second and third toes   Skin:     General: Skin is warm and dry. Findings: Bruising and rash ( Rash associated with lichen planus involving primarily both ankles) present. Neurological:      General: No focal deficit present. Mental Status: She is alert and oriented to person, place, and time. Psychiatric:         Mood and Affect: Mood normal.         Behavior: Behavior normal.         ASSESSMENT and PLAN    ICD-10-CM ICD-9-CM    1. Routine general medical examination at a health care facility  Z00.00 V70.0    2. Chronic obstructive pulmonary disease with acute exacerbation (HCC)  J44.1 491.21    3.  Tobacco abuse  Z72.0 305.1 4. Hypercholesterolemia  E78.00 272.0 LIPID PANEL   5. Lumbar disc disease with radiculopathy  M51.16 722.10 gabapentin (NEURONTIN) 100 mg capsule     724.4    6. Chronic anxiety  F41.9 300.00    7. Family history of colon cancer  Z80.0 V16.0 REFERRAL TO GASTROENTEROLOGY   8. Need for hepatitis C screening test  Z11.59 V73.89 HEPATITIS C AB   9. History of adenomatous polyp of colon  Z86.010 V12.72 REFERRAL TO GASTROENTEROLOGY   10. Neuropathy  G62.9 355.9 gabapentin (NEURONTIN) 100 mg capsule     Health maintenance:        Lab today to include hepatitis C antibody, lipid panel  Due for follow-up colonoscopy  Due for mammogram-patient will reschedule  Due for [id number]  Due for influenza immunization-declines    Avoid dietary starch and sugar and follow program of regular aerobic exercise  Work on smoking cessation  Increase gabapentin to 100 mg 3 times daily  Schedule mammogram  Gastroenterology referral  Return for follow-up in 6 months, sooner with any problems          PLEASE NOTE:   This document has been produced using voice recognition software. Unrecognized errors in transcription may be present.

## 2020-09-18 NOTE — PATIENT INSTRUCTIONS
Avoid dietary starch and sugar and follow program of regular aerobic exercise  Work on smoking cessation  Increase gabapentin to 100 mg 3 times daily  Schedule mammogram  Gastroenterology referral  Return for follow-up in 6 months, sooner with any problems

## 2020-09-18 NOTE — PROGRESS NOTES
Elsie Strickland is a 53 y.o. female (: 1965) presenting to address:    Chief Complaint   Patient presents with    Physical       Vitals:    20 0750   BP: 112/70   Pulse: 62   Resp: 14   Temp: 97.2 °F (36.2 °C)   TempSrc: Temporal   SpO2: 92%   Weight: 151 lb 3.2 oz (68.6 kg)   Height: 5' 5\" (1.651 m)   PainSc:   1   PainLoc: Toe       Hearing/Vision:   No exam data present    Learning Assessment:     Learning Assessment 2019   PRIMARY LEARNER Patient   HIGHEST LEVEL OF EDUCATION - PRIMARY LEARNER  DID NOT GRADUATE HIGH SCHOOL   BARRIERS PRIMARY LEARNER NONE   CO-LEARNER CAREGIVER No   PRIMARY LANGUAGE ENGLISH   LEARNER PREFERENCE PRIMARY READING   ANSWERED BY patient    RELATIONSHIP SELF     Depression Screening:     3 most recent PHQ Screens 2020   Little interest or pleasure in doing things Not at all   Feeling down, depressed, irritable, or hopeless Not at all   Total Score PHQ 2 0     Fall Risk Assessment:   No flowsheet data found. Abuse Screening:     Abuse Screening Questionnaire 2019   Do you ever feel afraid of your partner? N   Are you in a relationship with someone who physically or mentally threatens you? N   Is it safe for you to go home? Y     Coordination of Care Questionaire:   1. Have you been to the ER, urgent care clinic since your last visit? Hospitalized since your last visit? YES, [facility]    2. Have you seen or consulted any other health care providers outside of the [facility] since your last visit? Include any pap smears or colon screening. NO    Advanced Directive:   1. Do you have an Advanced Directive? YES    2. Would you like information on Advanced Directives?  NO

## 2020-09-20 ENCOUNTER — TELEPHONE (OUTPATIENT)
Dept: FAMILY MEDICINE CLINIC | Age: 55
End: 2020-09-20

## 2020-09-20 DIAGNOSIS — I25.10 CORONARY ARTERY DISEASE INVOLVING NATIVE HEART WITHOUT ANGINA PECTORIS, UNSPECIFIED VESSEL OR LESION TYPE: Primary | ICD-10-CM

## 2020-09-20 RX ORDER — ATORVASTATIN CALCIUM 80 MG/1
80 TABLET, FILM COATED ORAL DAILY
Qty: 90 TAB | Refills: 3 | Status: SHIPPED | OUTPATIENT
Start: 2020-09-20 | End: 2021-09-14 | Stop reason: SDUPTHER

## 2020-09-20 NOTE — TELEPHONE ENCOUNTER
Please advise Ms. Maryanne Mi that her lab results show her cholesterol levels are too high in view of her prior history of coronary artery disease. Please asked her to increase her atorvastatin from 40 to 80 mg daily. She can use up any left over 40 mg tablets by taking 2 at once and a prescription for 80 mg tablets has been sent to her pharmacy.

## 2021-02-11 ENCOUNTER — TELEPHONE (OUTPATIENT)
Dept: FAMILY MEDICINE CLINIC | Age: 56
End: 2021-02-11

## 2021-02-11 NOTE — TELEPHONE ENCOUNTER
Pt called to request a medical marijuana card; advised pt PCP is not certified to distribute the card requested; gave pt name of [facility].

## 2021-03-10 ENCOUNTER — TELEPHONE (OUTPATIENT)
Dept: FAMILY MEDICINE CLINIC | Age: 56
End: 2021-03-10

## 2021-03-10 NOTE — TELEPHONE ENCOUNTER
I believe that would be extremely unlikely. It has become more and more apparent that COVID-19 is transmitted via respiratory droplets shed by one person and inhaled by another. I am not aware of any reports of it being transmitted on food.

## 2021-03-10 NOTE — TELEPHONE ENCOUNTER
Patient called stating that she received candy in the mail that a friend [facility] for her. Patient states that a couple days after the candy was shipped the friend tested positive for covid. Patient would like to know if he and her family are at risk for abiola the virus. Please advise.

## 2021-03-26 NOTE — PATIENT INSTRUCTIONS
Continue current medications  Lab today further disposition pending lab results  Avoid dietary starch and sugar  Work on smoking cessation by decreasing by 1 cigarette/week  Mammogram ordered  Return for follow-up in 6 months, sooner with any problems       Stopping Smoking: Care Instructions  Your Care Instructions  Cigarette smokers crave the nicotine in cigarettes. Giving it up is much harder than simply changing a habit. Your body has to stop craving the nicotine. It is hard to quit, but you can do it. There are many tools that people use to quit smoking. You may find that combining tools works best for you. There are several steps to quitting. First you get ready to quit. Then you get support to help you. After that, you learn new skills and behaviors to become a nonsmoker. For many people, a necessary step is getting and using medicine. Your doctor will help you set up the plan that best meets your needs. You may want to attend a smoking cessation program to help you quit smoking. When you choose a program, look for one that has proven success. Ask your doctor for ideas. You will greatly increase your chances of success if you take medicine as well as get counseling or join a cessation program.  Some of the changes you feel when you first quit tobacco are uncomfortable. Your body will miss the nicotine at first, and you may feel short-tempered and grumpy. You may have trouble sleeping or concentrating. Medicine can help you deal with these symptoms. You may struggle with changing your smoking habits and rituals. The last step is the tricky one: Be prepared for the smoking urge to continue for a time. This is a lot to deal with, but keep at it. You will feel better. Follow-up care is a key part of your treatment and safety. Be sure to make and go to all appointments, and call your doctor if you are having problems.  It's also a good idea to know your test results and keep a list of the medicines you take.  How can you care for yourself at home? · Ask your family, friends, and coworkers for support. You have a better chance of quitting if you have help and support. · Join a support group, such as Nicotine Anonymous, for people who are trying to quit smoking. · Consider signing up for a smoking cessation program, such as the American Lung Association's Freedom from Smoking program.  · Get text messaging support. Go to the website at www.smokefree. gov to sign up for the [facility] program.  · Set a quit date. Pick your date carefully so that it is not right in the middle of a big deadline or stressful time. Once you quit, do not even take a puff. Get rid of all ashtrays and lighters after your last cigarette. Clean your house and your clothes so that they do not smell of smoke. · Learn how to be a nonsmoker. Think about ways you can avoid those things that make you reach for a cigarette. ? Avoid situations that put you at greatest risk for smoking. For some people, it is hard to have a drink with friends without smoking. For others, they might skip a coffee break with coworkers who smoke. ? Change your daily routine. Take a different route to work or eat a meal in a different place. · Cut down on stress. Calm yourself or release tension by doing an activity you enjoy, such as reading a book, taking a hot bath, or gardening. · Talk to your doctor or pharmacist about nicotine replacement therapy, which replaces the nicotine in your body. You still get nicotine but you do not use tobacco. Nicotine replacement products help you slowly reduce the amount of nicotine you need. These products come in several forms, many of them available over-the-counter:  ? Nicotine patches  ? Nicotine gum and lozenges  ? Nicotine inhaler  · Ask your doctor about bupropion (Wellbutrin) or varenicline (Chantix), which are prescription medicines. They do not contain nicotine.  They help you by reducing withdrawal symptoms, such as stress and anxiety. · Some people find hypnosis, acupuncture, and massage helpful for ending the smoking habit. · Eat a healthy diet and get regular exercise. Having healthy habits will help your body move past its craving for nicotine. · Be prepared to keep trying. Most people are not successful the first few times they try to quit. Do not get mad at yourself if you smoke again. Make a list of things you learned and think about when you want to try again, such as next week, next month, or next year. Where can you learn more? Go to http://bouchra-melisa.info/. Enter Y213 in the search box to learn more about \"Stopping Smoking: Care Instructions. \"  Current as of: September 26, 2018  Content Version: 12.2  © 4634-3369 [employer]. Care instructions adapted under license by [employer] (which disclaims liability or warranty for this information). If you have questions about a medical condition or this instruction, always ask your healthcare professional. [place] [address] any warranty or liability for your use of this information. Learning About Benefits From Quitting Smoking  How does quitting smoking make you healthier? If you're thinking about quitting smoking, you may have a few reasons to be smoke-free. Your health may be one of them. · When you quit smoking, you lower your risks for cancer, lung disease, heart attack, stroke, blood vessel disease, and blindness from macular degeneration. · When you're smoke-free, you get sick less often, and you heal faster. You are less likely to get colds, flu, bronchitis, and pneumonia. · As a nonsmoker, you may find that your mood is better and you are less stressed. When and how will you feel healthier? Quitting has real health benefits that start from day 1 of being smoke-free. And the longer you stay smoke-free, the healthier you get and the better you feel.   The first hours  · After just 20 minutes, your blood pressure and heart rate go down. That means there's less stress on your heart and blood vessels. · Within 12 hours, the level of carbon monoxide in your blood drops back to normal. That makes room for more oxygen. With more oxygen in your body, you may notice that you have more energy than when you smoked. After 2 weeks  · Your lungs start to work better. · Your risk of heart attack starts to drop. After 1 month  · When your lungs are clear, you cough less and breathe deeper, so it's easier to be active. · Your sense of taste and smell return. That means you can enjoy food more than you have since you started smoking. Over the years  · After 1 year, your risk of heart disease is half what it would be if you kept smoking. · After 5 years, your risk of stroke starts to shrink. Within a few years after that, it's about the same as if you'd never smoked. · After 10 years, your risk of dying from lung cancer is cut by about half. And your risk for many other types of cancer is lower too. How would quitting help others in your life? When you quit smoking, you improve the health of everyone who now breathes in your smoke. · Their heart, lung, and cancer risks drop, much like yours. · They are sick less. For babies and small children, living smoke-free means they're less likely to have ear infections, pneumonia, and bronchitis. · If you're a woman who is or will be pregnant someday, quitting smoking means a healthier . · Children who are close to you are less likely to become adult smokers. Where can you learn more? Go to http://bouchra-melisa.info/. Enter 052 [id number] 72 11 in the search box to learn more about \"Learning About Benefits From Quitting Smoking. \"  Current as of: 2018  Content Version: 12.2  © 9188-6593 [employer].  Care instructions adapted under license by [employer] (which disclaims liability or warranty for this information). If you have questions about a medical condition or this instruction, always ask your healthcare professional. [place] [address] any warranty or liability for your use of this information. 57

## 2021-09-14 ENCOUNTER — OFFICE VISIT (OUTPATIENT)
Dept: FAMILY MEDICINE CLINIC | Age: 56
End: 2021-09-14
Payer: MEDICAID

## 2021-09-14 ENCOUNTER — APPOINTMENT (OUTPATIENT)
Dept: FAMILY MEDICINE CLINIC | Age: 56
End: 2021-09-14

## 2021-09-14 VITALS
HEART RATE: 67 BPM | OXYGEN SATURATION: 97 % | DIASTOLIC BLOOD PRESSURE: 78 MMHG | SYSTOLIC BLOOD PRESSURE: 130 MMHG | BODY MASS INDEX: 25.16 KG/M2 | TEMPERATURE: 98.2 F | RESPIRATION RATE: 16 BRPM | WEIGHT: 151 LBS | HEIGHT: 65 IN

## 2021-09-14 DIAGNOSIS — F17.210 CONTINUOUS DEPENDENCE ON CIGARETTE SMOKING: ICD-10-CM

## 2021-09-14 DIAGNOSIS — I25.10 CORONARY ARTERY DISEASE INVOLVING NATIVE HEART WITHOUT ANGINA PECTORIS, UNSPECIFIED VESSEL OR LESION TYPE: ICD-10-CM

## 2021-09-14 DIAGNOSIS — E78.00 HYPERCHOLESTEROLEMIA: ICD-10-CM

## 2021-09-14 DIAGNOSIS — J44.1 CHRONIC OBSTRUCTIVE PULMONARY DISEASE WITH ACUTE EXACERBATION (HCC): ICD-10-CM

## 2021-09-14 DIAGNOSIS — G62.9 NEUROPATHY: ICD-10-CM

## 2021-09-14 DIAGNOSIS — M51.16 LUMBAR DISC DISEASE WITH RADICULOPATHY: ICD-10-CM

## 2021-09-14 DIAGNOSIS — I25.10 CORONARY ARTERY DISEASE INVOLVING NATIVE CORONARY ARTERY OF NATIVE HEART WITHOUT ANGINA PECTORIS: ICD-10-CM

## 2021-09-14 DIAGNOSIS — Z00.00 ROUTINE GENERAL MEDICAL EXAMINATION AT A HEALTH CARE FACILITY: Primary | ICD-10-CM

## 2021-09-14 PROCEDURE — 99396 PREV VISIT EST AGE 40-64: CPT | Performed by: FAMILY MEDICINE

## 2021-09-14 RX ORDER — LIDOCAINE 50 MG/G
PATCH TOPICAL
Qty: 30 PATCH | Refills: 12 | Status: SHIPPED | OUTPATIENT
Start: 2021-09-14 | End: 2021-10-19 | Stop reason: SDUPTHER

## 2021-09-14 RX ORDER — ATORVASTATIN CALCIUM 80 MG/1
80 TABLET, FILM COATED ORAL DAILY
Qty: 90 TABLET | Refills: 3 | Status: SHIPPED | OUTPATIENT
Start: 2021-09-14 | End: 2021-10-17

## 2021-09-14 RX ORDER — GABAPENTIN 300 MG/1
300 CAPSULE ORAL 3 TIMES DAILY
Qty: 270 CAPSULE | Refills: 3 | Status: SHIPPED | OUTPATIENT
Start: 2021-09-14 | End: 2021-09-14 | Stop reason: SDUPTHER

## 2021-09-14 RX ORDER — GABAPENTIN 300 MG/1
300 CAPSULE ORAL 3 TIMES DAILY
Qty: 270 CAPSULE | Refills: 3 | Status: SHIPPED | OUTPATIENT
Start: 2021-09-14 | End: 2021-10-19 | Stop reason: SDUPTHER

## 2021-09-14 RX ORDER — LIDOCAINE 50 MG/G
PATCH TOPICAL
Qty: 30 PATCH | Refills: 12 | Status: SHIPPED | OUTPATIENT
Start: 2021-09-14 | End: 2021-09-14 | Stop reason: SDUPTHER

## 2021-09-14 RX ORDER — ATORVASTATIN CALCIUM 80 MG/1
80 TABLET, FILM COATED ORAL DAILY
Qty: 90 TABLET | Refills: 3 | Status: SHIPPED | OUTPATIENT
Start: 2021-09-14 | End: 2021-09-14 | Stop reason: SDUPTHER

## 2021-09-14 RX ORDER — GABAPENTIN 100 MG/1
300 CAPSULE ORAL 3 TIMES DAILY
Status: CANCELLED | OUTPATIENT
Start: 2021-09-14

## 2021-09-14 NOTE — PATIENT INSTRUCTIONS
Lab studies ordered, further disposition pending lab results  Continue current medications pending lab results  Work on smoking cessation  Return for follow-up and annual physical exam in 1 year or sooner with any problems

## 2021-09-14 NOTE — PROGRESS NOTES
HISTORY OF PRESENT ILLNESS  Deo Patton is a 63 y.o. female. She presents for follow-up after emergency department evaluation at [facility] emergency department on 2021 with chest pain, cough and shortness of breath as well as for health assessment, preventative care and follow-up with a history of coronary artery disease, hyperlipidemia, COPD and continued dependence on cigarette smoking. Review of the emergency department report indicates that patient's EKG was unremarkable however a portable chest x-ray showed an increased prominence of interstitial markings not seen on previous studies and possibly consistent with atypical pneumonia. The patient elected to leave the emergency [facility] prior to completion of the work-up and was treated with doxycycline and an albuterol inhaler. COVID-19 PCR was negative. She reports that she is feeling much better, essentially back to baseline especially with regard to pulmonary function and symptoms. Mr#: [id number]      Past Medical History:   Diagnosis Date    Autoimmune disease ([address]     Celiac disease     Chronic obstructive pulmonary disease ([address]     Colon polyps     Endocrine disease     Heart attack ([address]     2017    Hypertension     Immune deficiency disorder ([address]     Pars defect     Seizure ([address]     last seizure 10 years ago per patient.  it was a grand mal seizure    Seizures ([address]        Past Surgical History:   Procedure Laterality Date    COLONOSCOPY N/A 2017    COLONOSCOPY, SURVEILLANCE with Bxs performed by Greg Acosta [facility] at [facility] ENDOSCOPY    HX APPENDECTOMY      HX  SECTION      HX COLONOSCOPY      HX ENDOSCOPY      HX HYSTERECTOMY      HX LAPAROTOMY      HX BULMARO AND BSO      HX TONSILLECTOMY         Family History   Problem Relation Age of Onset    Cancer Mother     Diabetes Mother     Hypertension Mother     Colon Cancer Mother     Heart Attack Mother     Diabetes Father     Hypertension Father     Heart Disease Father     Heart Attack Father     Heart Disease Sister     Heart Attack Sister     Diabetes Sister     Diabetes Maternal Grandmother     Heart Attack Maternal Grandmother     Diabetes Maternal Grandfather     Diabetes Paternal Grandmother     Diabetes Paternal Grandfather        Allergies   Allergen Reactions    Aspirin Nausea and Vomiting    Iodinated Contrast Media Angioedema    Penicillin G Rash and Nausea Only    Adhesive Itching    Codeine Nausea and Vomiting    Gluten Other (comments)     Causes abdominal pain, diarrhea, vomiting; pt has Celiac disease    Iodinated Contrast Media Other (comments)    Iodine Rash    Pcn [Penicillins] Hives    Tylenol [Acetaminophen] Hives and Nausea and Vomiting       Social History     Tobacco Use   Smoking Status Current Some Day Smoker    Packs/day: 1.00    Years: 15.00    Pack years: 15.00    Types: Cigarettes   Smokeless Tobacco Never Used   Tobacco Comment    using patches       Social History     Substance and Sexual Activity   Alcohol Use Never         Patient Active Problem List   Diagnosis Code    Seizure ([facility] R56.9    CAD (coronary artery disease) I25.10    Simple chronic bronchitis (HCC) J41.0    Thyroid nodule E04.1    Sciatica of right side M54.31    Chronic obstructive pulmonary disease with acute exacerbation (HCC) J44.1    Neck pain M54.2    Tobacco abuse Z72.0         Current Outpatient Medications:     atorvastatin (LIPITOR) 80 mg tablet, Take 1 Tab by mouth daily. , Disp: 90 Tab, Rfl: 3    gabapentin (NEURONTIN) 100 mg capsule, Take 1 Cap by mouth three (3) times daily. Max Daily Amount: 300 mg.  (Patient taking differently: Take 300 mg by mouth three (3) times daily.), Disp: 90 Cap, Rfl: 5    TRELEGY ELLIPTA 100-62.5-25 mcg inhaler, INHALE 1 PUFF BY MOUTH ONCE DAILY RINSE AND GARGLE AFTER USE, Disp: , Rfl:     lidocaine (LIDODERM) 5 %, Apply patch to the affected area for 12 hours a day and remove for 12 hours a day., Disp: 30 Patch, Rfl: 12    albuterol (PROVENTIL HFA, VENTOLIN HFA, PROAIR HFA) 90 mcg/actuation inhaler, Take 2 Puffs by inhalation every four (4) hours as needed for Wheezing., Disp: 1 Inhaler, Rfl: 12       Review of Systems   Constitutional: Negative for chills, fever and weight loss. HENT: Negative for congestion, ear pain, hearing loss and sore throat. Eyes: Negative for blurred vision and double vision. Respiratory: Positive for cough, shortness of breath and wheezing. Chronic-COPD   Cardiovascular: Negative for chest pain, palpitations and leg swelling. Gastrointestinal: Negative for abdominal pain, blood in stool, constipation, diarrhea, heartburn, melena, nausea and vomiting. Genitourinary: Negative for dysuria and urgency. Musculoskeletal: Positive for back pain ( Chronic ). Negative for joint pain and myalgias. Skin: Negative for itching and rash. Swelling with insect bites   Neurological: Negative for dizziness, tingling, sensory change, focal weakness and headaches. Left 2nd and 3rd toes go numb   Endo/Heme/Allergies: Negative for environmental allergies. Psychiatric/Behavioral: Negative for depression. The patient is not nervous/anxious and does not have insomnia. Lonely after moving     Visit Vitals  /78   Pulse 67   Temp 98.2 °F (36.8 °C) (Temporal)   Resp 16   Ht 5' 5\" (1.651 m)   Wt 151 lb (68.5 kg)   SpO2 97%   BMI 25.13 kg/m²       Physical Exam  Vitals and nursing note reviewed. Constitutional:       General: She is not in acute distress. Appearance: Normal appearance. She is not ill-appearing. HENT:      Head: Normocephalic. Right Ear: Tympanic membrane, ear canal and external ear normal.      Left Ear: Tympanic membrane, ear canal and external ear normal.      Mouth/Throat:      Mouth: Mucous membranes are moist.      Pharynx: Oropharynx is clear.    Eyes:      Extraocular Movements: Extraocular movements intact. Conjunctiva/sclera: Conjunctivae normal.      Pupils: Pupils are equal, round, and reactive to light. Neck:      Vascular: No carotid bruit. Cardiovascular:      Rate and Rhythm: Normal rate and regular rhythm. Heart sounds: Normal heart sounds. Pulmonary:      Effort: Pulmonary effort is normal.      Breath sounds: Normal breath sounds. Abdominal:      General: Bowel sounds are normal.      Palpations: Abdomen is soft. Tenderness: There is no abdominal tenderness. Musculoskeletal:         General: No deformity. Cervical back: Neck supple. Right lower leg: No edema. Left lower leg: No edema. Skin:     General: Skin is warm and dry. Neurological:      Mental Status: She is alert and oriented to person, place, and time. Psychiatric:         Mood and Affect: Mood normal.         Behavior: Behavior normal.         Thought Content: Thought content normal.         ASSESSMENT and PLAN    ICD-10-CM ICD-9-CM    1. Routine general medical examination at a health care facility  Z00.00 V70.0    2. Hypercholesterolemia  E78.00 272.0 TSH 3RD GENERATION      METABOLIC PANEL, COMPREHENSIVE      LIPID PANEL   3. Chronic obstructive pulmonary disease with acute exacerbation (HCC)  J44.1 491.21 CBC WITH AUTOMATED DIFF   4. Coronary artery disease involving native coronary artery of native heart without angina pectoris  I25.10 414.01 CBC WITH AUTOMATED DIFF   5. Continuous dependence on cigarette smoking  F17.210 305.1    6. Lumbar disc disease with radiculopathy  M51.16 722.10 gabapentin (NEURONTIN) 300 mg capsule     724.4 lidocaine (Lidoderm) 5 %      DISCONTINUED: gabapentin (NEURONTIN) 300 mg capsule      DISCONTINUED: lidocaine (Lidoderm) 5 %   7. Neuropathy  G62.9 355.9    8.  Coronary artery disease involving native heart without angina pectoris, unspecified vessel or lesion type  I25.10 414.01 atorvastatin (LIPITOR) 80 mg tablet DISCONTINUED: atorvastatin (LIPITOR) 80 mg tablet   Assessment:  Suspected pneumonia diagnosed 9/5/2021  Hyperlipidemia status to be determined pending lab results  Coronary artery disease no current symptoms of cardiac ischemia  Continuous dependence on cigarette smoking unchanged    Health maintenance recommendations:  COVID-19 immunization  PPSV23 immunization  Influenza immunization  Mammogram  Colorectal cancer screening    Patient indicates that she is not comfortable having immunizations until after dental surgery scheduled later this month following that she plans to obtain COVID-19 immunization and is advised to return with a nurse visit for PPSV23 and influenza immunization 2 weeks after the second Covid immunization    Plan:  Lab studies ordered, further disposition pending lab results  Continue current medications pending lab results  Work on smoking cessation  Return for follow-up and annual physical exam in 1 year or sooner with any problems    Lori Dailey MD      PLEASE NOTE:   This document has been produced using voice recognition software. Unrecognized errors in transcription may be present.

## 2021-09-14 NOTE — PROGRESS NOTES
Alejandra Chiu is a 63 y.o. female (: 1965) presenting to address:    Chief Complaint   Patient presents with   [facility] Follow Up     seen in er for pneumonia given doxycycline and proair . took 7 days course of antibiotic .  Medication Refill     was only given 100 mg of gabapentin and should have been 300 mg .     Other     DMV form for license plate .  Skin Problem     noticed the last 6 month mosquitoes bite getting very swollen .  Immunization/Injection     declined flu shot at this time . Vitals:    21 1101   BP: 130/78   Pulse: 67   Resp: 16   Temp: 98.2 °F (36.8 °C)   TempSrc: Temporal   SpO2: 97%   Weight: 151 lb (68.5 kg)   Height: 5' 5\" (1.651 m)   PainSc:   0 - No pain       Hearing/Vision:   No exam data present    Learning Assessment:     Learning Assessment 2020   PRIMARY LEARNER Patient   HIGHEST LEVEL OF EDUCATION - PRIMARY LEARNER  DID NOT GRADUATE HIGH SCHOOL   BARRIERS PRIMARY LEARNER NONE   CO-LEARNER CAREGIVER No   PRIMARY LANGUAGE ENGLISH    NEED No   LEARNER PREFERENCE PRIMARY DEMONSTRATION   ANSWERED BY patient   RELATIONSHIP SELF     Depression Screening:     3 most recent PHQ Screens 2021   Little interest or pleasure in doing things Not at all   Feeling down, depressed, irritable, or hopeless Not at all   Total Score PHQ 2 0     Fall Risk Assessment:     Fall Risk Assessment, last 12 mths 2020   Able to walk? Yes   Fall in past 12 months? No     Abuse Screening:     Abuse Screening Questionnaire 2020   Do you ever feel afraid of your partner? N   Are you in a relationship with someone who physically or mentally threatens you? N   Is it safe for you to go home? Y     Coordination of Care Questionaire:   1. Have you been to the ER, urgent care clinic since your last visit? Hospitalized since your last visit? Yes     2.  Have you seen or consulted any other health care providers outside of the [address] [place] [address] since your last visit? Include any pap smears or colon screening. NO    Advanced Directive:   1. Do you have an Advanced Directive? NO    2. Would you like information on Advanced Directives?  NO

## 2021-09-15 LAB
ALBUMIN SERPL-MCNC: 4.5 G/DL (ref 3.8–4.9)
ALBUMIN/GLOB SERPL: 1.8 {RATIO} (ref 1.2–2.2)
ALP SERPL-CCNC: 93 IU/L (ref 44–121)
ALT SERPL-CCNC: 15 IU/L (ref 0–32)
AST SERPL-CCNC: 21 IU/L (ref 0–40)
BASOPHILS # BLD AUTO: 0.1 X10E3/UL (ref 0–0.2)
BASOPHILS NFR BLD AUTO: 1 %
BILIRUB SERPL-MCNC: 0.5 MG/DL (ref 0–1.2)
BUN SERPL-MCNC: 13 MG/DL (ref 6–24)
BUN/CREAT SERPL: 17 (ref 9–23)
CALCIUM SERPL-MCNC: 9.4 MG/DL (ref 8.7–10.2)
CHLORIDE SERPL-SCNC: 103 MMOL/L (ref 96–106)
CHOLEST SERPL-MCNC: 207 MG/DL (ref 100–199)
CO2 SERPL-SCNC: 25 MMOL/L (ref 20–29)
CREAT SERPL-MCNC: 0.77 MG/DL (ref 0.57–1)
EOSINOPHIL # BLD AUTO: 0.3 X10E3/UL (ref 0–0.4)
EOSINOPHIL NFR BLD AUTO: 3 %
ERYTHROCYTE [DISTWIDTH] IN BLOOD BY AUTOMATED COUNT: 12.9 % (ref 11.7–15.4)
GLOBULIN SER CALC-MCNC: 2.5 G/DL (ref 1.5–4.5)
GLUCOSE SERPL-MCNC: 88 MG/DL (ref 65–99)
HCT VFR BLD AUTO: 46.3 % (ref 34–46.6)
HDLC SERPL-MCNC: 55 MG/DL
HGB BLD-MCNC: 15.3 G/DL (ref 11.1–15.9)
IMM GRANULOCYTES # BLD AUTO: 0 X10E3/UL (ref 0–0.1)
IMM GRANULOCYTES NFR BLD AUTO: 0 %
IMP & REVIEW OF LAB RESULTS: NORMAL
LDLC SERPL CALC-MCNC: 137 MG/DL (ref 0–99)
LYMPHOCYTES # BLD AUTO: 2 X10E3/UL (ref 0.7–3.1)
LYMPHOCYTES NFR BLD AUTO: 25 %
MCH RBC QN AUTO: 31 PG (ref 26.6–33)
MCHC RBC AUTO-ENTMCNC: 33 G/DL (ref 31.5–35.7)
MCV RBC AUTO: 94 FL (ref 79–97)
MONOCYTES # BLD AUTO: 0.7 X10E3/UL (ref 0.1–0.9)
MONOCYTES NFR BLD AUTO: 9 %
NEUTROPHILS # BLD AUTO: 5 X10E3/UL (ref 1.4–7)
NEUTROPHILS NFR BLD AUTO: 62 %
PLATELET # BLD AUTO: 306 X10E3/UL (ref 150–450)
POTASSIUM SERPL-SCNC: 4.2 MMOL/L (ref 3.5–5.2)
PROT SERPL-MCNC: 7 G/DL (ref 6–8.5)
RBC # BLD AUTO: 4.93 X10E6/UL (ref 3.77–5.28)
SODIUM SERPL-SCNC: 142 MMOL/L (ref 134–144)
TRIGL SERPL-MCNC: 81 MG/DL (ref 0–149)
TSH SERPL DL<=0.005 MIU/L-ACNC: 1.09 UIU/ML (ref 0.45–4.5)
VLDLC SERPL CALC-MCNC: 15 MG/DL (ref 5–40)
WBC # BLD AUTO: 8.2 X10E3/UL (ref 3.4–10.8)

## 2021-09-16 NOTE — PROGRESS NOTES
Please advise that lab results show cholesterol levels have increased.   Please make sure she is taking atorvastatin 80 mg daily as prescribed and encouraged her to decrease dietary starch and sugar and increase aerobic exercise

## 2021-10-17 DIAGNOSIS — I25.10 CORONARY ARTERY DISEASE INVOLVING NATIVE HEART WITHOUT ANGINA PECTORIS, UNSPECIFIED VESSEL OR LESION TYPE: ICD-10-CM

## 2021-10-17 RX ORDER — ATORVASTATIN CALCIUM 80 MG/1
TABLET, FILM COATED ORAL
Qty: 90 TABLET | Refills: 3 | Status: SHIPPED | OUTPATIENT
Start: 2021-10-17 | End: 2021-10-19 | Stop reason: SDUPTHER

## 2021-10-19 DIAGNOSIS — M51.16 LUMBAR DISC DISEASE WITH RADICULOPATHY: ICD-10-CM

## 2021-10-19 DIAGNOSIS — I25.10 CORONARY ARTERY DISEASE INVOLVING NATIVE HEART WITHOUT ANGINA PECTORIS, UNSPECIFIED VESSEL OR LESION TYPE: ICD-10-CM

## 2021-10-19 NOTE — TELEPHONE ENCOUNTER
Patient is requesting medication be sent to mail order . Lipitor last refilled 10/17/21 for 90 with 3 . Gabapentin 9/14/21 for 270 with 3 refills . Lidocaine 30 patches with 12 refills . Last OV 9/14/21 No future appointments. No

## 2021-10-20 RX ORDER — ATORVASTATIN CALCIUM 80 MG/1
80 TABLET, FILM COATED ORAL DAILY
Qty: 90 TABLET | Refills: 3 | Status: SHIPPED | OUTPATIENT
Start: 2021-10-20 | End: 2022-11-03 | Stop reason: SINTOL

## 2021-10-20 RX ORDER — GABAPENTIN 300 MG/1
300 CAPSULE ORAL 3 TIMES DAILY
Qty: 270 CAPSULE | Refills: 3 | Status: SHIPPED | OUTPATIENT
Start: 2021-10-20 | End: 2022-10-06 | Stop reason: SDUPTHER

## 2021-10-20 RX ORDER — LIDOCAINE 50 MG/G
PATCH TOPICAL
Qty: 90 PATCH | Refills: 3 | Status: SHIPPED | OUTPATIENT
Start: 2021-10-20 | End: 2022-10-06 | Stop reason: SDUPTHER

## 2021-10-26 DIAGNOSIS — J44.1 CHRONIC OBSTRUCTIVE PULMONARY DISEASE WITH ACUTE EXACERBATION (HCC): ICD-10-CM

## 2021-10-26 RX ORDER — ALBUTEROL SULFATE 90 UG/1
2 AEROSOL, METERED RESPIRATORY (INHALATION)
Qty: 3 EACH | Refills: 3 | Status: SHIPPED | OUTPATIENT
Start: 2021-10-26 | End: 2022-10-06 | Stop reason: SDUPTHER

## 2022-03-18 PROBLEM — E04.1 THYROID NODULE: Status: ACTIVE | Noted: 2019-07-29

## 2022-03-19 PROBLEM — J41.0 SIMPLE CHRONIC BRONCHITIS (HCC): Status: ACTIVE | Noted: 2019-07-29

## 2022-03-19 PROBLEM — R56.9 SEIZURE (HCC): Status: ACTIVE | Noted: 2017-03-20

## 2022-03-19 PROBLEM — M54.2 NECK PAIN: Status: ACTIVE | Noted: 2019-09-18

## 2022-03-19 PROBLEM — Z72.0 TOBACCO ABUSE: Status: ACTIVE | Noted: 2019-09-18

## 2022-03-19 PROBLEM — J44.1 CHRONIC OBSTRUCTIVE PULMONARY DISEASE WITH ACUTE EXACERBATION (HCC): Status: ACTIVE | Noted: 2019-09-18

## 2022-03-19 PROBLEM — I25.10 CAD (CORONARY ARTERY DISEASE): Status: ACTIVE | Noted: 2017-03-22

## 2022-03-20 PROBLEM — M54.31 SCIATICA OF RIGHT SIDE: Status: ACTIVE | Noted: 2019-07-29

## 2022-10-05 NOTE — PROGRESS NOTES
HISTORY OF PRESENT ILLNESS  Krzysztof Rivera is a 61 y.o. female. She presents for health assessment, preventative care and follow-up with a history of coronary artery disease, hyperlipidemia, COPD and continued dependence on cigarette smoking. Today she reports episodes over the past 2 months where about an hour or 2 after breakfast she develops shakiness and diffuse weakness and thinks that on one occasion she briefly lost consciousness. The symptoms resolve after eating and drinking. She also reports worsening symptoms associated with her lumbar disc disease with radiation to both lower extremities. She indicates that she has had imaging studies but none are currently evaluate available in the electronic medical record. She indicates that she was previously seeing a \"back specialist\" who released her from care. Mr#: [id number]      Past Medical History:   Diagnosis Date    Autoimmune disease ([address]     Celiac disease     Chronic obstructive pulmonary disease ([address]     Colon polyps     Endocrine disease     Heart attack ([address]     2017    Hypertension     Immune deficiency disorder ([facility] [facility]     Pars defect     Seizure ([address]     last seizure 10 years ago per patient.  it was a grand mal seizure    Seizures ([address]        Past Surgical History:   Procedure Laterality Date    COLONOSCOPY N/A 2017    COLONOSCOPY, SURVEILLANCE with Bxs performed by Abelardo Montesinos MD multiple tubular adenomas, 3-year follow-up    HX APPENDECTOMY      HX  SECTION      HX COLONOSCOPY      HX ENDOSCOPY      HX HYSTERECTOMY      HX LAPAROTOMY      HX BULMARO AND BSO      HX TONSILLECTOMY         Family History   Problem Relation Age of Onset    Cancer Mother     Diabetes Mother     Hypertension Mother     Colon Cancer Mother     Heart Attack Mother     Diabetes Father     Hypertension Father     Heart Disease Father     Heart Attack Father     Heart Disease Sister     Heart Attack Sister     Diabetes Sister     Diabetes Maternal Grandmother     Heart Attack Maternal Grandmother     Diabetes Maternal Grandfather     Diabetes Paternal Grandmother     Diabetes Paternal Grandfather        Allergies   Allergen Reactions    Aspirin Nausea and Vomiting    Iodinated Contrast Media Angioedema    Penicillin G Rash and Nausea Only    Adhesive Itching    Codeine Nausea and Vomiting    Gluten Other (comments)     Causes abdominal pain, diarrhea, vomiting; pt has Celiac disease    Iodinated Contrast Media Other (comments)    Iodine Rash    Pcn [Penicillins] Hives    Tylenol [Acetaminophen] Hives and Nausea and Vomiting       Social History     Tobacco Use   Smoking Status Some Days    Packs/day: 1.00    Years: 15.00    Pack years: 15.00    Types: Cigarettes   Smokeless Tobacco Never   Tobacco Comments    using patches       Social History     Substance and Sexual Activity   Alcohol Use Never         Patient Active Problem List   Diagnosis Code    Seizure ([facility] [address] R56.9    CAD (coronary artery disease) I25.10    Simple chronic bronchitis (HCC) J41.0    Thyroid nodule E04.1    Sciatica of right side M54.31    Chronic obstructive pulmonary disease with acute exacerbation (HCC) J44.1    Neck pain M54.2    Tobacco abuse Z72.0         Current Outpatient Medications:     betamethasone dipropionate (DIPROSONE) 0.05 % topical cream, Apply  to affected area two (2) times daily as needed for Skin Irritation. , Disp: , Rfl:     albuterol (PROVENTIL HFA, VENTOLIN HFA, PROAIR HFA) 90 mcg/actuation inhaler, Take 2 Puffs by inhalation every four (4) hours as needed for Wheezing., Disp: 3 Each, Rfl: 3    gabapentin (NEURONTIN) 300 mg capsule, Take 1 Capsule by mouth three (3) times daily. Max Daily Amount: 900 mg., Disp: 270 Capsule, Rfl: 3    atorvastatin (LIPITOR) 80 mg tablet, Take 1 Tablet by mouth daily. , Disp: 90 Tablet, Rfl: 3    lidocaine (Lidoderm) 5 %, Apply patch to the affected area for 12 hours a day and remove for 12 hours a day., Disp: 90 Patch, Rfl: 3 OSIEL ELLIPTA 100-62.5-25 mcg inhaler, INHALE 1 PUFF BY MOUTH ONCE DAILY RINSE AND GARGLE AFTER USE, Disp: , Rfl:        Review of Systems   Constitutional:  Negative for fever and weight loss. Cardiovascular:  Negative for chest pain, palpitations and leg swelling. Musculoskeletal:  Positive for back pain (See HPI). Negative for joint pain. Neurological:  Positive for weakness (With episodes described in the history of present illness). Endo/Heme/Allergies:         Episodes suggesting reactive hypoglycemia, see HPI   Psychiatric/Behavioral:  Negative for depression and suicidal ideas. The patient is not nervous/anxious. Visit Vitals  BP (!) 134/90   Pulse 65   Temp 98.1 °F (36.7 °C) (Temporal)   Resp 16   Ht 5' 6\" (1.676 m)   Wt 160 lb (72.6 kg)   SpO2 98%   BMI 25.82 kg/m²       Physical Exam  Vitals and nursing note reviewed. Constitutional:       General: She is not in acute distress. Appearance: Normal appearance. She is not ill-appearing. HENT:      Head: Normocephalic. Eyes:      Extraocular Movements: Extraocular movements intact. Conjunctiva/sclera: Conjunctivae normal.      Pupils: Pupils are equal, round, and reactive to light. Neck:      Vascular: No carotid bruit. Cardiovascular:      Rate and Rhythm: Normal rate and regular rhythm. Heart sounds: Normal heart sounds. Pulmonary:      Effort: Pulmonary effort is normal.      Breath sounds: Normal breath sounds. Musculoskeletal:      Cervical back: Neck supple. Skin:     General: Skin is warm and dry. Neurological:      Mental Status: She is alert and oriented to person, place, and time. Psychiatric:         Mood and Affect: Mood normal.         Behavior: Behavior normal.       ASSESSMENT and PLAN    ICD-10-CM ICD-9-CM    1. Reactive hypoglycemia  E16.1 251.2 INSULIN, FREE & TOTAL      2.  Coronary artery disease involving native heart without angina pectoris, unspecified vessel or lesion type  I25.10 414.01 CBC WITH AUTOMATED DIFF      3. Hypercholesterolemia  E78.00 272.0       4. Chronic obstructive pulmonary disease with acute exacerbation (HCC)  J44.1 491.21 albuterol (PROVENTIL HFA, VENTOLIN HFA, PROAIR HFA) 90 mcg/actuation inhaler      5. Lumbar disc disease with radiculopathy  M51.16 722.10 gabapentin (NEURONTIN) 300 mg capsule     724.4 lidocaine (Lidoderm) 5 %      6. Continuous dependence on cigarette smoking  F17.210 305.1 PNEUMOCOCCAL, PCV20, PREVNAR 20, (AGE 18 YRS+), IM, PF      7. Encounter for immunization  Z23 V03.89 ADMIN INFLUENZA VIRUS VAC      8. Needs flu shot  Z23 V04.81 INFLUENZA, FLUARIX, FLULAVAL, FLUZONE (AGE 6 MO+), AFLURIA(AGE 3Y+) IM, PF, 0.5 ML      9.  Primary hypertension  I10 401.9 losartan (COZAAR) 25 mg tablet      URINALYSIS W/ RFLX MICROSCOPIC      METABOLIC PANEL, COMPREHENSIVE      LIPID PANEL      Assessment:  No reported symptoms suggesting cardiac ischemia  Status of hyperlipidemia to be determined pending lab results  COPD symptoms stable  No change in continuous dependence on cigarette smoking  Persistent bilateral low back pain with radicular symptoms  Symptoms suggestive of reactive hypoglycemia    Health maintenance recommendations:  COVID-19 immunization with bivalent vaccine  PCV-20 pneumococcal immunization today  Influenza immunization today  Mammogram to be discussed at follow-up  Colonoscopy be discussed at follow-up    Plan:  Begin losartan 25 mg daily  Avoid dietary starch and sugar, divide meals into 6 small meals comprised of protein and complex carbohydrates to prevent episodes of low blood sugar  Sign release for imaging results  Lab studies ordered, further disposition pending lab results if indicated  Continue current medications pending lab results  Work on smoking cessation  Return for Medicare wellness evaluation in 3-4  Please always arrive at least 15 minutes before your scheduled appointment time  This encounter has required over 30 minutes for reviewing previous notes, test results and for face to face time with the patient discussing the diagnosis and importance of compliance with the treatment plan as well for documentation on the day of the visit. Shalom Winters MD      PLEASE NOTE:   This document has been produced using voice recognition software. Unrecognized errors in transcription may be present.

## 2022-10-06 ENCOUNTER — APPOINTMENT (OUTPATIENT)
Dept: FAMILY MEDICINE CLINIC | Age: 57
End: 2022-10-06

## 2022-10-06 ENCOUNTER — OFFICE VISIT (OUTPATIENT)
Dept: FAMILY MEDICINE CLINIC | Age: 57
End: 2022-10-06
Payer: MEDICARE

## 2022-10-06 VITALS
TEMPERATURE: 98.1 F | RESPIRATION RATE: 16 BRPM | BODY MASS INDEX: 25.71 KG/M2 | WEIGHT: 160 LBS | HEIGHT: 66 IN | DIASTOLIC BLOOD PRESSURE: 90 MMHG | OXYGEN SATURATION: 98 % | HEART RATE: 65 BPM | SYSTOLIC BLOOD PRESSURE: 134 MMHG

## 2022-10-06 DIAGNOSIS — Z23 ENCOUNTER FOR IMMUNIZATION: ICD-10-CM

## 2022-10-06 DIAGNOSIS — I10 PRIMARY HYPERTENSION: ICD-10-CM

## 2022-10-06 DIAGNOSIS — I25.10 CORONARY ARTERY DISEASE INVOLVING NATIVE HEART WITHOUT ANGINA PECTORIS, UNSPECIFIED VESSEL OR LESION TYPE: ICD-10-CM

## 2022-10-06 DIAGNOSIS — E78.00 HYPERCHOLESTEROLEMIA: ICD-10-CM

## 2022-10-06 DIAGNOSIS — M51.16 LUMBAR DISC DISEASE WITH RADICULOPATHY: ICD-10-CM

## 2022-10-06 DIAGNOSIS — F17.210 CONTINUOUS DEPENDENCE ON CIGARETTE SMOKING: ICD-10-CM

## 2022-10-06 DIAGNOSIS — E16.1 REACTIVE HYPOGLYCEMIA: ICD-10-CM

## 2022-10-06 DIAGNOSIS — E16.1 REACTIVE HYPOGLYCEMIA: Primary | ICD-10-CM

## 2022-10-06 DIAGNOSIS — J44.1 CHRONIC OBSTRUCTIVE PULMONARY DISEASE WITH ACUTE EXACERBATION (HCC): ICD-10-CM

## 2022-10-06 DIAGNOSIS — Z23 NEEDS FLU SHOT: ICD-10-CM

## 2022-10-06 PROCEDURE — 3017F COLORECTAL CA SCREEN DOC REV: CPT | Performed by: FAMILY MEDICINE

## 2022-10-06 PROCEDURE — G8419 CALC BMI OUT NRM PARAM NOF/U: HCPCS | Performed by: FAMILY MEDICINE

## 2022-10-06 PROCEDURE — 90686 IIV4 VACC NO PRSV 0.5 ML IM: CPT | Performed by: FAMILY MEDICINE

## 2022-10-06 PROCEDURE — G8510 SCR DEP NEG, NO PLAN REQD: HCPCS | Performed by: FAMILY MEDICINE

## 2022-10-06 PROCEDURE — G0008 ADMIN INFLUENZA VIRUS VAC: HCPCS | Performed by: FAMILY MEDICINE

## 2022-10-06 PROCEDURE — 99214 OFFICE O/P EST MOD 30 MIN: CPT | Performed by: FAMILY MEDICINE

## 2022-10-06 PROCEDURE — 90677 PCV20 VACCINE IM: CPT | Performed by: FAMILY MEDICINE

## 2022-10-06 PROCEDURE — G8427 DOCREV CUR MEDS BY ELIG CLIN: HCPCS | Performed by: FAMILY MEDICINE

## 2022-10-06 RX ORDER — ALBUTEROL SULFATE 90 UG/1
2 AEROSOL, METERED RESPIRATORY (INHALATION)
Qty: 3 EACH | Refills: 3 | Status: SHIPPED | OUTPATIENT
Start: 2022-10-06

## 2022-10-06 RX ORDER — LOSARTAN POTASSIUM 25 MG/1
25 TABLET ORAL DAILY
Qty: 30 TABLET | Refills: 1 | Status: SHIPPED | OUTPATIENT
Start: 2022-10-06 | End: 2022-11-03 | Stop reason: SDUPTHER

## 2022-10-06 RX ORDER — GABAPENTIN 300 MG/1
300 CAPSULE ORAL 3 TIMES DAILY
Qty: 270 CAPSULE | Refills: 3 | Status: SHIPPED | OUTPATIENT
Start: 2022-10-06

## 2022-10-06 RX ORDER — BETAMETHASONE DIPROPIONATE 0.5 MG/G
CREAM TOPICAL
COMMUNITY

## 2022-10-06 RX ORDER — LIDOCAINE 50 MG/G
PATCH TOPICAL
Qty: 90 PATCH | Refills: 3 | Status: SHIPPED | OUTPATIENT
Start: 2022-10-06 | End: 2022-11-03 | Stop reason: ALTCHOICE

## 2022-10-06 NOTE — PATIENT INSTRUCTIONS
Assessment:  No reported symptoms suggesting cardiac ischemia  Status of hyperlipidemia to be determined pending lab results  COPD symptoms stable  No change in continuous dependence on cigarette smoking  Persistent bilateral low back pain with radicular symptoms  Symptoms suggestive of reactive hypoglycemia    Health maintenance recommendations:  COVID-19 immunization with bivalent vaccine  PCV-20 pneumococcal immunization today  Influenza immunization today  Mammogram to be discussed at follow-up  Colonoscopy be discussed at follow-up    Plan:  Avoid dietary starch and sugar, divide meals into 6 small meals comprised of protein and complex carbohydrates to prevent episodes of low blood sugar  Sign release for imaging results  Lab studies ordered, further disposition pending lab results if indicated  Continue current medications pending lab results  Work on smoking cessation  Return for Medicare wellness evaluation in 3-4  Please always arrive at least 15 minutes before your scheduled appointment time

## 2022-10-06 NOTE — PROGRESS NOTES
Nino Flores is a 61 y.o. female (: 1965) presenting to address:    Chief Complaint   Patient presents with    Complete Physical     Would like to talk to doctor before getting flu shot . Dizziness     For last 2 to 3 months at least 3 to 4 times a month has episodes were vision  gets blurry, passes out cold sweats and shakes . Improves after eating crackers and drink pepsi and gives it 30 minutes will get better . In the last 4 days it has been happening daily . Leg Pain     And hips ache so bad she cant sleep . Lung Dr gave her someone one time for sleep which did help doesn't recall the name . Vitals:    10/06/22 1037 10/06/22 1040   BP: (!) 140/80 (!) 134/90   Pulse: 65    Resp: 16    Temp: 98.1 °F (36.7 °C)    TempSrc: Temporal    SpO2: 98%    Weight: 160 lb (72.6 kg)    Height: 5' 6\" (1.676 m)    PainSc:   0 - No pain        Hearing/Vision:   No results found. Learning Assessment:     Learning Assessment 2020   PRIMARY LEARNER Patient   HIGHEST LEVEL OF EDUCATION - PRIMARY LEARNER  DID NOT GRADUATE HIGH SCHOOL   BARRIERS PRIMARY LEARNER NONE   CO-LEARNER CAREGIVER No   PRIMARY LANGUAGE ENGLISH    NEED No   LEARNER PREFERENCE PRIMARY DEMONSTRATION   ANSWERED BY patient   RELATIONSHIP SELF     Depression Screening:     3 most recent PHQ Screens 10/6/2022   Little interest or pleasure in doing things Not at all   Feeling down, depressed, irritable, or hopeless Not at all   Total Score PHQ 2 0     Fall Risk Assessment:     Fall Risk Assessment, last 12 mths 2020   Able to walk? Yes   Fall in past 12 months? No     Abuse Screening:     Abuse Screening Questionnaire 2020   Do you ever feel afraid of your partner? N   Are you in a relationship with someone who physically or mentally threatens you? N   Is it safe for you to go home? Y     ADL Assessment:   No flowsheet data found. Coordination of Care Questionaire:   1.  \"Have you been to the ER, urgent care clinic since your last visit? Hospitalized since your last visit? \" No    2. \"Have you seen or consulted any other health care providers outside of the [address] [place] [address] since your last visit? \" Yes pulm and derm     3. For patients aged 39-70: Has the patient had a colonoscopy? No     If the patient is female:    4. For patients aged 41-77: Has the patient had a mammogram within the past 2 years? No    5. For patients aged 21-65: Has the patient had a pap smear? No    Advanced Directive:   1. Do you have an Advanced Directive? NO    2. Would you like information on Advanced Directives? NO  Flu shot , Prevnar 20 Immunization/s administered 10/6/2022 by Lino Justin LPN with guardian's consent. Patient tolerated procedure well. No reactions noted.

## 2022-10-12 LAB
ALBUMIN SERPL-MCNC: 4.9 G/DL (ref 3.8–4.9)
ALBUMIN/GLOB SERPL: 1.8 {RATIO} (ref 1.2–2.2)
ALP SERPL-CCNC: 107 IU/L (ref 44–121)
ALT SERPL-CCNC: 16 IU/L (ref 0–32)
APPEARANCE UR: CLEAR
AST SERPL-CCNC: 21 IU/L (ref 0–40)
BACTERIA #/AREA URNS HPF: ABNORMAL /[HPF]
BASOPHILS # BLD AUTO: 0.1 X10E3/UL (ref 0–0.2)
BASOPHILS NFR BLD AUTO: 1 %
BILIRUB SERPL-MCNC: 0.5 MG/DL (ref 0–1.2)
BILIRUB UR QL STRIP: NEGATIVE
BUN SERPL-MCNC: 16 MG/DL (ref 6–24)
BUN/CREAT SERPL: 19 (ref 9–23)
CALCIUM SERPL-MCNC: 9.9 MG/DL (ref 8.7–10.2)
CASTS URNS QL MICRO: ABNORMAL /LPF
CHLORIDE SERPL-SCNC: 102 MMOL/L (ref 96–106)
CHOLEST SERPL-MCNC: 241 MG/DL (ref 100–199)
CO2 SERPL-SCNC: 20 MMOL/L (ref 20–29)
COLOR UR: YELLOW
CREAT SERPL-MCNC: 0.84 MG/DL (ref 0.57–1)
CRYSTALS URNS MICRO: ABNORMAL
EGFR: 81 ML/MIN/1.73
EOSINOPHIL # BLD AUTO: 0.2 X10E3/UL (ref 0–0.4)
EOSINOPHIL NFR BLD AUTO: 2 %
EPI CELLS #/AREA URNS HPF: ABNORMAL /HPF (ref 0–10)
ERYTHROCYTE [DISTWIDTH] IN BLOOD BY AUTOMATED COUNT: 12.8 % (ref 11.7–15.4)
GLOBULIN SER CALC-MCNC: 2.7 G/DL (ref 1.5–4.5)
GLUCOSE SERPL-MCNC: 94 MG/DL (ref 70–99)
GLUCOSE UR QL STRIP: NEGATIVE
HCT VFR BLD AUTO: 50.5 % (ref 34–46.6)
HDLC SERPL-MCNC: 53 MG/DL
HGB BLD-MCNC: 16.8 G/DL (ref 11.1–15.9)
HGB UR QL STRIP: NEGATIVE
IMM GRANULOCYTES # BLD AUTO: 0 X10E3/UL (ref 0–0.1)
IMM GRANULOCYTES NFR BLD AUTO: 0 %
IMP & REVIEW OF LAB RESULTS: NORMAL
INSULIN FREE SERPL-ACNC: 4 UU/ML
INSULIN SERPL-ACNC: 4 UU/ML
KETONES UR QL STRIP: NEGATIVE
LDLC SERPL CALC-MCNC: 168 MG/DL (ref 0–99)
LEUKOCYTE ESTERASE UR QL STRIP: ABNORMAL
LYMPHOCYTES # BLD AUTO: 2.4 X10E3/UL (ref 0.7–3.1)
LYMPHOCYTES NFR BLD AUTO: 25 %
MCH RBC QN AUTO: 30.4 PG (ref 26.6–33)
MCHC RBC AUTO-ENTMCNC: 33.3 G/DL (ref 31.5–35.7)
MCV RBC AUTO: 91 FL (ref 79–97)
MICRO URNS: ABNORMAL
MONOCYTES # BLD AUTO: 0.8 X10E3/UL (ref 0.1–0.9)
MONOCYTES NFR BLD AUTO: 8 %
NEUTROPHILS # BLD AUTO: 6.3 X10E3/UL (ref 1.4–7)
NEUTROPHILS NFR BLD AUTO: 64 %
NITRITE UR QL STRIP: NEGATIVE
PH UR STRIP: 6.5 [PH] (ref 5–7.5)
PLATELET # BLD AUTO: 316 X10E3/UL (ref 150–450)
POTASSIUM SERPL-SCNC: 3.9 MMOL/L (ref 3.5–5.2)
PROT SERPL-MCNC: 7.6 G/DL (ref 6–8.5)
PROT UR QL STRIP: NEGATIVE
RBC # BLD AUTO: 5.53 X10E6/UL (ref 3.77–5.28)
RBC #/AREA URNS HPF: ABNORMAL /HPF (ref 0–2)
SODIUM SERPL-SCNC: 142 MMOL/L (ref 134–144)
SP GR UR STRIP: 1.02 (ref 1–1.03)
TRIGL SERPL-MCNC: 110 MG/DL (ref 0–149)
UNIDENT CRYS URNS QL MICRO: PRESENT
UROBILINOGEN UR STRIP-MCNC: 0.2 MG/DL (ref 0.2–1)
VLDLC SERPL CALC-MCNC: 20 MG/DL (ref 5–40)
WBC # BLD AUTO: 9.8 X10E3/UL (ref 3.4–10.8)
WBC #/AREA URNS HPF: ABNORMAL /HPF (ref 0–5)

## 2022-11-02 PROBLEM — F17.210 CONTINUOUS DEPENDENCE ON CIGARETTE SMOKING: Status: ACTIVE | Noted: 2022-11-02

## 2022-11-02 PROBLEM — M51.16 LUMBAR DISC DISEASE WITH RADICULOPATHY: Status: ACTIVE | Noted: 2022-11-02

## 2022-11-02 PROBLEM — E78.00 HYPERCHOLESTEROLEMIA: Status: ACTIVE | Noted: 2022-11-02

## 2022-11-02 PROBLEM — G62.9 NEUROPATHY: Status: ACTIVE | Noted: 2022-11-02

## 2022-11-02 PROBLEM — I10 PRIMARY HYPERTENSION: Status: ACTIVE | Noted: 2022-11-02

## 2022-11-02 PROBLEM — I25.10 CORONARY ARTERY DISEASE INVOLVING NATIVE HEART WITHOUT ANGINA PECTORIS: Status: ACTIVE | Noted: 2017-03-22

## 2022-11-02 NOTE — PROGRESS NOTES
HISTORY OF PRESENT ILLNESS  Randal Fountain is a 61 y.o. female. She presents for follow-up with a history of primary hypertension after starting on losartan 25 mg daily 1 month ago at which time she also reported symptoms suggestive of reactive hypoglycemia and was provided with specific dietary recommendations and with a history of coronary artery disease, hyperlipidemia, COPD, continuous dependence on cigarette smoking, a reported history of lumbar disc disease with radiculopathy and peripheral neuropathy as well as for welcome to Medicare evaluation      Mr#: [id number]      Past Medical History:   Diagnosis Date    Autoimmune disease ([facility]     Celiac disease     Chronic obstructive pulmonary disease ([facility]     Colon polyps     Endocrine disease     Heart attack ([facility]     2017    Hypertension     Immune deficiency disorder ([facility]     Pars defect     Seizure ([facility]     last seizure 10 years ago per patient.  it was a grand mal seizure    Seizures ([facility]        Past Surgical History:   Procedure Laterality Date    COLONOSCOPY N/A 2017    COLONOSCOPY, SURVEILLANCE by Russel Brown MD multiple tubular adenomas, 3-year follow-up    HX APPENDECTOMY      HX  SECTION      HX COLONOSCOPY      HX ENDOSCOPY      HX HYSTERECTOMY      HX LAPAROTOMY      HX BULMARO AND BSO      HX TONSILLECTOMY         Family History   Problem Relation Age of Onset    Cancer Mother     Diabetes Mother     Hypertension Mother     Colon Cancer Mother     Heart Attack Mother     Diabetes Father     Hypertension Father     Heart Disease Father     Heart Attack Father     Heart Disease Sister     Heart Attack Sister     Diabetes Sister     Diabetes Maternal Grandmother     Heart Attack Maternal Grandmother     Diabetes Maternal Grandfather     Diabetes Paternal Grandmother     Diabetes Paternal Grandfather        Allergies   Allergen Reactions    Aspirin Nausea and Vomiting    Iodinated Contrast Media Angioedema    Penicillin G Rash and Nausea Only    Adhesive Itching    Codeine Nausea and Vomiting    Gluten Other (comments)     Causes abdominal pain, diarrhea, vomiting; pt has Celiac disease    Iodinated Contrast Media Other (comments)    Iodine Rash    Pcn [Penicillins] Hives    Tylenol [Acetaminophen] Hives and Nausea and Vomiting       Social History     Tobacco Use   Smoking Status Some Days    Packs/day: 1.00    Years: 15.00    Pack years: 15.00    Types: Cigarettes   Smokeless Tobacco Never   Tobacco Comments    using patches       Social History     Substance and Sexual Activity   Alcohol Use Never         Patient Active Problem List   Diagnosis Code    Seizure ([facility] R56.9    Coronary artery disease involving native heart without angina pectoris I25.10    Simple chronic bronchitis (HCC) J41.0    Thyroid nodule E04.1    Sciatica of right side M54.31    Chronic obstructive pulmonary disease with acute exacerbation ([facility]) J44.1    Neck pain M54.2    Tobacco abuse Z72.0    Primary hypertension I10    Hypercholesterolemia E78.00    Continuous dependence on cigarette smoking F17.210    Lumbar disc disease with radiculopathy M51.16    Neuropathy G62.9         Current Outpatient Medications:     lidocaine (Lidocaine Pain Relief) 4 % patch, by TransDERmal route as needed. , Disp: , Rfl:     betamethasone dipropionate (DIPROSONE) 0.05 % topical cream, Apply  to affected area two (2) times daily as needed for Skin Irritation. , Disp: , Rfl:     gabapentin (NEURONTIN) 300 mg capsule, Take 1 Capsule by mouth three (3) times daily. Max Daily Amount: 900 mg., Disp: 270 Capsule, Rfl: 3    albuterol (PROVENTIL HFA, VENTOLIN HFA, PROAIR HFA) 90 mcg/actuation inhaler, Take 2 Puffs by inhalation every four (4) hours as needed for Wheezing., Disp: 3 Each, Rfl: 3    losartan (COZAAR) 25 mg tablet, Take 1 Tablet by mouth daily. , Disp: 30 Tablet, Rfl: 1    TRELEGY ELLIPTA 100-62.5-25 mcg inhaler, INHALE 1 PUFF BY MOUTH ONCE DAILY RINSE AND GARGLE AFTER USE, Disp: , Rfl:        Review of Systems   Constitutional:  Negative for fever, malaise/fatigue and weight loss. HENT:  Negative for congestion, ear pain, hearing loss and sore throat. Eyes:  Negative for blurred vision. Respiratory:  Positive for wheezing. Negative for cough and shortness of breath. Doing well with Trelegy   Cardiovascular:  Positive for leg swelling. Negative for chest pain, palpitations and orthopnea. Gastrointestinal:  Negative for abdominal pain, blood in stool, constipation, diarrhea, heartburn, melena, nausea and vomiting. Genitourinary:  Negative for dysuria, frequency, hematuria and urgency. Musculoskeletal:  Positive for back pain and myalgias (hips to feet). Skin:  Negative for itching and rash. Neurological:  Negative for dizziness, tingling, sensory change, focal weakness and headaches. Endo/Heme/Allergies:  Negative for environmental allergies. Psychiatric/Behavioral:  Negative for depression and suicidal ideas. The patient is nervous/anxious (with driving). Visit Vitals  /80   Pulse 71   Temp 97.5 °F (36.4 °C) (Temporal)   Resp 16   Ht 5' 6\" (1.676 m)   Wt 150 lb (68 kg)   SpO2 98%   BMI 24.21 kg/m²       Physical Exam  Vitals and nursing note reviewed. Constitutional:       General: She is not in acute distress. Appearance: Normal appearance. She is normal weight. She is not ill-appearing. HENT:      Head: Normocephalic. Right Ear: Tympanic membrane, ear canal and external ear normal.      Left Ear: Tympanic membrane, ear canal and external ear normal.      Mouth/Throat:      Mouth: Mucous membranes are moist.      Pharynx: Oropharynx is clear. Eyes:      Extraocular Movements: Extraocular movements intact. Conjunctiva/sclera: Conjunctivae normal.      Pupils: Pupils are equal, round, and reactive to light. Neck:      Vascular: No carotid bruit. Cardiovascular:      Rate and Rhythm: Normal rate and regular rhythm.       Heart sounds: Normal heart sounds. Pulmonary:      Effort: Pulmonary effort is normal.      Breath sounds: Normal breath sounds. Abdominal:      Palpations: Abdomen is soft. Tenderness: There is no abdominal tenderness. Musculoskeletal:         General: No deformity. Cervical back: Neck supple. Right lower leg: No edema. Left lower leg: No edema. Skin:     General: Skin is warm and dry. Neurological:      Mental Status: She is alert and oriented to person, place, and time. Psychiatric:         Mood and Affect: Mood normal.         Behavior: Behavior normal.         Thought Content: Thought content normal.               ASSESSMENT and PLAN    ICD-10-CM ICD-9-CM    1. Welcome to Medicare preventive visit  Z00.00 V70.0       2. Primary hypertension  I10 401.9       3. Reactive hypoglycemia  E16.1 251.2       4. Coronary artery disease involving native heart without angina pectoris, unspecified vessel or lesion type  I25.10 414.01       5. Hypercholesterolemia  E78.00 272.0       6. Pulmonary emphysema, unspecified emphysema type ([facility]  J43.9 492.8       7. Continuous dependence on cigarette smoking  F17.210 305.1       8. Lumbar disc disease with radiculopathy  M51.16 722.10      724.4       9. Neuropathy  G62.9 355.9       10.  Tobacco abuse  Z72.0 305.1       Assessment:  Hypertension well controlled  Reactive hypoglycemia symptoms (normal insulin levels) improved with diet modification  No reported symptoms suggesting cardiac ischemia  Unsatisfactory lipid levels to be addressed-has discontinued atorvastatin because of side effects  COPD symptoms stable  Radiculopathy symptoms stable  Neuropathy symptoms unchanged  No change in continuous dependence on cigarette smoking    Plan:  Continue losartan  Continue dietary guidelines for reactive hypoglycemia symptoms  Discontinue atorvastatin pravastatin 80 mg daily-return for fasting lab studies after 3 months on pravastatin  Continue Trelegy inhaler and work on smoking cessation  Still awaiting imaging results from previous scans regarding lumbar disc disease  Continue gabapentin mainly at bedtime, take 300-600 mg daily at bedtime  Return for follow-up in 1 year or sooner with any problems    Date of Service:  2022   Patient Name:  Heather Martinez   Patient :  1965     This is a \"Welcome to [employer]"  Initial Preventive Physical Examination (IPPE) providing Personalized Prevention Plan Services (Performed in the first 12 months of enrollment)     I have reviewed the patient's medical history in detail and updated the computerized patient record. History     Past Medical History:   Diagnosis Date    Autoimmune disease ([address]     Celiac disease     Chronic obstructive pulmonary disease ([address]     Colon polyps     Endocrine disease     Heart attack ([address]     2017    Hypertension     Immune deficiency disorder ([address]     Pars defect     Seizure ([address]     last seizure 10 years ago per patient. it was a grand mal seizure    Seizures ([address]       Past Surgical History:   Procedure Laterality Date    COLONOSCOPY N/A 2017    COLONOSCOPY, SURVEILLANCE with Bxs performed by Kaz Sandoval MD multiple tubular adenomas, 3-year follow-up    HX APPENDECTOMY      HX  SECTION      HX COLONOSCOPY      HX ENDOSCOPY      HX HYSTERECTOMY      HX LAPAROTOMY      HX BULMARO AND BSO      HX TONSILLECTOMY       Current Outpatient Medications   Medication Sig Dispense Refill    betamethasone dipropionate (DIPROSONE) 0.05 % topical cream Apply  to affected area two (2) times daily as needed for Skin Irritation. gabapentin (NEURONTIN) 300 mg capsule Take 1 Capsule by mouth three (3) times daily. Max Daily Amount: 900 mg. 270 Capsule 3    lidocaine (Lidoderm) 5 % Apply patch to the affected area for 12 hours a day and remove for 12 hours a day.  90 Patch 3    albuterol (PROVENTIL HFA, VENTOLIN HFA, PROAIR HFA) 90 mcg/actuation inhaler Take 2 Puffs by inhalation every four (4) hours as needed for Wheezing. 3 Each 3    losartan (COZAAR) 25 mg tablet Take 1 Tablet by mouth daily. 30 Tablet 1    atorvastatin (LIPITOR) 80 mg tablet Take 1 Tablet by mouth daily.  90 Tablet 3    TRELEGY ELLIPTA 100-62.5-25 mcg inhaler INHALE 1 PUFF BY MOUTH ONCE DAILY RINSE AND GARGLE AFTER USE       Allergies   Allergen Reactions    Aspirin Nausea and Vomiting    Iodinated Contrast Media Angioedema    Penicillin G Rash and Nausea Only    Adhesive Itching    Codeine Nausea and Vomiting    Gluten Other (comments)     Causes abdominal pain, diarrhea, vomiting; pt has Celiac disease    Iodinated Contrast Media Other (comments)    Iodine Rash    Pcn [Penicillins] Hives    Tylenol [Acetaminophen] Hives and Nausea and Vomiting     Family History   Problem Relation Age of Onset    Cancer Mother     Diabetes Mother     Hypertension Mother     Colon Cancer Mother     Heart Attack Mother     Diabetes Father     Hypertension Father     Heart Disease Father     Heart Attack Father     Heart Disease Sister     Heart Attack Sister     Diabetes Sister     Diabetes Maternal Grandmother     Heart Attack Maternal Grandmother     Diabetes Maternal Grandfather     Diabetes Paternal Grandmother     Diabetes Paternal Grandfather      Social History     Tobacco Use    Smoking status: Some Days     Packs/day: 1.00     Years: 15.00     Pack years: 15.00     Types: Cigarettes    Smokeless tobacco: Never    Tobacco comments:     using patches   Substance Use Topics    Alcohol use: Never     Patient Active Problem List   Diagnosis Code    [facility] R56.9    Coronary artery disease involving native heart without angina pectoris I25.10    Simple chronic bronchitis (HCC) J41.0    Thyroid nodule E04.1    Sciatica of right side M54.31    Chronic obstructive pulmonary disease with acute exacerbation (HCC) J44.1    Neck pain M54.2    Tobacco abuse Z72.0    Primary hypertension I10    Hypercholesterolemia E78.00 Continuous dependence on cigarette smoking F17.210    Lumbar disc disease with radiculopathy M51.16    Neuropathy G62.9       Living situation:   -- Lives  alone,  -- Stairs in home no    Diet, Lifestyle:  smoker    Exercise level: not active    Depression Risk Screen     3 most recent PHQ Screens 10/6/2022   Little interest or pleasure in doing things Not at all   Feeling down, depressed, irritable, or hopeless Not at all   Total Score PHQ 2 0     Alcohol Risk Screen   Do you average more than 1 drink per night or more than 7 drinks a week:  No    On any one occasion in the past three months have you have had more than 3 drinks containing alcohol:  No    Functional Ability and Level of Safety     Hearing Loss   Hearing is good. Activities of Daily Living   Self-care     Fall Risk Screen     Fall Risk Assessment, last 12 mths 9/18/2020   Able to walk? Yes   Fall in past 12 months? No     Abuse Screen   Patient is not abused    Review of Systems   Review of systems reported in the separate problem-oriented documentation. Physical Examination   Physical exam reported in the separate problem oriented documentation. Evaluation of Cognitive Function:  No reported history or findings suggesting cognitive dysfunction  Mood/affect:  neutral  Appearance: age appropriate  Family member/caregiver input: none    EKG Screening: normal EKG, normal sinus rhythm.      Patient Care Team:  Kiarra Monge MD as PCP - General (Family Medicine)  Kiarra Monge MD [facility] PCP - [facility]  Care, Kamla Campbell, Catarina Shipman MD (Gastroenterology)     End-of-life planning  Advanced Directive discussed and documented: YES    Advice/Counseling   Education and counseling provided:  Advance directives counseling/discussion  Counseling regarding risks of addicting medication and substances  Assessment/Plan       ICD-10-CM ICD-9-CM    1. Welcome to Medicare preventive visit  Z00.00 V70.0 AMB POC EKG ROUTINE W/ 12 LEADS, INTER & REP      2. Primary hypertension  I10 401.9 losartan (COZAAR) 25 mg tablet      3. Reactive hypoglycemia  E16.1 251.2       4. Coronary artery disease involving native heart without angina pectoris, unspecified vessel or lesion type  I25.10 414.01 AMB POC EKG ROUTINE W/ 12 LEADS, INTER & REP      5. Hypercholesterolemia  E78.00 272.0 pravastatin (PRAVACHOL) 40 mg tablet      6. Pulmonary emphysema, unspecified emphysema type ([facility]  J43.9 492.8       7. Continuous dependence on cigarette smoking  F17.210 305.1       8. Lumbar disc disease with radiculopathy  M51.16 722.10      724.4       9.  Neuropathy  G62.9 355.9       Health maintenance recommendations:    5-year personalized preventative services plan/recommendations:  Complete and return advance directives form  Consider the Shingrix immunization series to reduce the risk of shingles, available to pharmacy  COVID-19 immunization with bivalent vaccine  Mammogram overdue now  Colonoscopy was due July 2020  Medicare wellness evaluation due November 2023      Zoey Wong was provided a written 5-year personalized preventive services plan, which was included in her Jase Toure MD

## 2022-11-03 ENCOUNTER — OFFICE VISIT (OUTPATIENT)
Dept: FAMILY MEDICINE CLINIC | Age: 57
End: 2022-11-03
Payer: MEDICARE

## 2022-11-03 VITALS
DIASTOLIC BLOOD PRESSURE: 80 MMHG | HEIGHT: 66 IN | SYSTOLIC BLOOD PRESSURE: 130 MMHG | OXYGEN SATURATION: 98 % | TEMPERATURE: 97.5 F | HEART RATE: 71 BPM | WEIGHT: 150 LBS | RESPIRATION RATE: 16 BRPM | BODY MASS INDEX: 24.11 KG/M2

## 2022-11-03 DIAGNOSIS — Z00.00 WELCOME TO MEDICARE PREVENTIVE VISIT: Primary | ICD-10-CM

## 2022-11-03 DIAGNOSIS — I10 PRIMARY HYPERTENSION: ICD-10-CM

## 2022-11-03 DIAGNOSIS — G62.9 NEUROPATHY: ICD-10-CM

## 2022-11-03 DIAGNOSIS — I25.10 CORONARY ARTERY DISEASE INVOLVING NATIVE HEART WITHOUT ANGINA PECTORIS, UNSPECIFIED VESSEL OR LESION TYPE: ICD-10-CM

## 2022-11-03 DIAGNOSIS — E16.1 REACTIVE HYPOGLYCEMIA: ICD-10-CM

## 2022-11-03 DIAGNOSIS — F17.210 CONTINUOUS DEPENDENCE ON CIGARETTE SMOKING: ICD-10-CM

## 2022-11-03 DIAGNOSIS — E78.00 HYPERCHOLESTEROLEMIA: ICD-10-CM

## 2022-11-03 DIAGNOSIS — M51.16 LUMBAR DISC DISEASE WITH RADICULOPATHY: ICD-10-CM

## 2022-11-03 DIAGNOSIS — J43.9 PULMONARY EMPHYSEMA, UNSPECIFIED EMPHYSEMA TYPE (HCC): ICD-10-CM

## 2022-11-03 PROCEDURE — 3078F DIAST BP <80 MM HG: CPT | Performed by: FAMILY MEDICINE

## 2022-11-03 PROCEDURE — 3074F SYST BP LT 130 MM HG: CPT | Performed by: FAMILY MEDICINE

## 2022-11-03 PROCEDURE — G8427 DOCREV CUR MEDS BY ELIG CLIN: HCPCS | Performed by: FAMILY MEDICINE

## 2022-11-03 PROCEDURE — G8510 SCR DEP NEG, NO PLAN REQD: HCPCS | Performed by: FAMILY MEDICINE

## 2022-11-03 PROCEDURE — G0403 EKG FOR INITIAL PREVENT EXAM: HCPCS | Performed by: FAMILY MEDICINE

## 2022-11-03 PROCEDURE — 99214 OFFICE O/P EST MOD 30 MIN: CPT | Performed by: FAMILY MEDICINE

## 2022-11-03 PROCEDURE — 3017F COLORECTAL CA SCREEN DOC REV: CPT | Performed by: FAMILY MEDICINE

## 2022-11-03 PROCEDURE — G0402 INITIAL PREVENTIVE EXAM: HCPCS | Performed by: FAMILY MEDICINE

## 2022-11-03 PROCEDURE — G8420 CALC BMI NORM PARAMETERS: HCPCS | Performed by: FAMILY MEDICINE

## 2022-11-03 RX ORDER — LOSARTAN POTASSIUM 25 MG/1
25 TABLET ORAL DAILY
Qty: 90 TABLET | Refills: 3 | Status: SHIPPED | OUTPATIENT
Start: 2022-11-03

## 2022-11-03 RX ORDER — LIDOCAINE 4 G/100G
PATCH TOPICAL AS NEEDED
COMMUNITY

## 2022-11-03 RX ORDER — PRAVASTATIN SODIUM 40 MG/1
40 TABLET ORAL
Qty: 90 TABLET | Refills: 3 | Status: SHIPPED | OUTPATIENT
Start: 2022-11-03

## 2022-11-03 NOTE — PROGRESS NOTES
Sj Levy is a 61 y.o. female (: 1965) presenting to address:    Chief Complaint   Patient presents with    Welcome To Medicare    Nausea     Stopped taking cholesterol medication for the last week feels so much better no nausea or vomiting . Vitals:    22 1028   BP: 130/80   Pulse: 71   Resp: 16   Temp: 97.5 °F (36.4 °C)   TempSrc: Temporal   SpO2: 98%   Weight: 150 lb (68 kg)   Height: 5' 6\" (1.676 m)   PainSc:   0 - No pain       Hearing/Vision:     Vision Screening    Right eye Left eye Both eyes   Without correction      With correction 20/20 20/20 20/20       Learning Assessment:     Learning Assessment 2020   PRIMARY LEARNER Patient   HIGHEST LEVEL OF EDUCATION - PRIMARY LEARNER  DID NOT GRADUATE HIGH SCHOOL   BARRIERS PRIMARY LEARNER NONE   CO-LEARNER CAREGIVER No   PRIMARY LANGUAGE ENGLISH    NEED No   LEARNER PREFERENCE PRIMARY DEMONSTRATION   ANSWERED BY patient   RELATIONSHIP SELF     Depression Screening:     3 most recent PHQ Screens 11/3/2022   Little interest or pleasure in doing things Not at all   Feeling down, depressed, irritable, or hopeless Not at all   Total Score PHQ 2 0     Fall Risk Assessment:     Fall Risk Assessment, last 12 mths 2020   Able to walk? Yes   Fall in past 12 months? No     Abuse Screening:     Abuse Screening Questionnaire 2020   Do you ever feel afraid of your partner? N   Are you in a relationship with someone who physically or mentally threatens you? N   Is it safe for you to go home?  Y     ADL Assessment:     ADL Assessment 11/3/2022   Feeding yourself No Help Needed   Getting from bed to chair No Help Needed   Getting dressed No Help Needed   Bathing or showering No Help Needed   Walk across the room (includes cane/walker) No Help Needed   Using the telphone No Help Needed   Taking your medications No Help Needed   Preparing meals No Help Needed   Managing money (expenses/bills) No Help Needed   Moderately strenuous housework (laundry) No Help Needed   Shopping for personal items (toiletries/medicines) No Help Needed   Shopping for groceries No Help Needed   Driving No Help Needed   Climbing a flight of stairs No Help Needed   Getting to places beyond walking distances No Help Needed        Coordination of Care Questionaire:   1. \"Have you been to the ER, urgent care clinic since your last visit? Hospitalized since your last visit? \" No    2. \"Have you seen or consulted any other health care providers outside of the [address] [place] [address] [place] since your last visit? \" No     3. For patients aged 39-70: Has the patient had a colonoscopy? Yes - Care Gap present. Most recent result on file     If the patient is female:    4. For patients aged 41-77: Has the patient had a mammogram within the past 2 years? No    5. For patients aged 21-65: Has the patient had a pap smear? No    Advanced Directive:   1. Do you have an Advanced Directive? NO    2. Would you like information on Advanced Directives?  NO

## 2022-11-03 NOTE — PATIENT INSTRUCTIONS
5-year personalized preventative services plan/recommendations:  Complete and return advance directives form  Consider the Shingrix immunization series to reduce the risk of shingles, available to pharmacy  COVID-19 immunization with bivalent vaccine  Mammogram overdue now  Colonoscopy was due July 2020  Medicare wellness evaluation due November 2023    Assessment:  Hypertension well controlled  Reactive hypoglycemia symptoms (normal insulin levels) improved with diet modification  No reported symptoms suggesting cardiac ischemia  Unsatisfactory lipid levels to be addressed-has discontinued atorvastatin because of side effects  COPD symptoms stable  Radiculopathy symptoms stable  Neuropathy symptoms unchanged  No change in continuous dependence on cigarette smoking    Plan:  Continue losartan  Continue dietary guidelines for reactive hypoglycemia symptoms  Discontinue atorvastatin pravastatin 80 mg daily-return for fasting lab studies after 3 months on pravastatin  Continue Trelegy inhaler and work on smoking cessation  Still awaiting imaging results from previous scans regarding lumbar disc disease  Continue gabapentin mainly at bedtime, take 300-600 mg daily at bedtime  Return for follow-up in 1 year or sooner with any problems

## 2023-04-20 ENCOUNTER — TELEPHONE (OUTPATIENT)
Dept: FAMILY MEDICINE CLINIC | Facility: CLINIC | Age: 58
End: 2023-04-20

## 2023-04-20 NOTE — TELEPHONE ENCOUNTER
Pt is returning a call for her lab results. Please return her call. Pt gave permission to leave results on vm if she does not answer.

## 2023-04-27 ENCOUNTER — TELEPHONE (OUTPATIENT)
Dept: FAMILY MEDICINE CLINIC | Facility: CLINIC | Age: 58
End: 2023-04-27

## 2023-04-27 NOTE — TELEPHONE ENCOUNTER
Pa received for lidocaine 5% patches . Pa submitted and has been denied . Current med list has 4% otc lidocaine patches .  Faxed pharmacy to notify them of denial .

## 2023-05-11 ENCOUNTER — HOSPITAL ENCOUNTER (OUTPATIENT)
Facility: HOSPITAL | Age: 58
Discharge: HOME | End: 2023-05-11
Payer: MEDICARE

## 2023-05-11 DIAGNOSIS — Z12.31 BREAST CANCER SCREENING BY MAMMOGRAM: ICD-10-CM

## 2023-05-11 PROCEDURE — 77063 BREAST TOMOSYNTHESIS BI: CPT

## 2023-11-11 ASSESSMENT — ENCOUNTER SYMPTOMS
BLOOD IN STOOL: 0
SHORTNESS OF BREATH: 0
CONSTIPATION: 0
COUGH: 0
NAUSEA: 0
DIARRHEA: 0
ANAL BLEEDING: 0
VOMITING: 0
SORE THROAT: 0
WHEEZING: 0
ABDOMINAL PAIN: 0

## 2023-11-13 ENCOUNTER — HOSPITAL ENCOUNTER (OUTPATIENT)
Facility: HOSPITAL | Age: 58
Setting detail: SPECIMEN
Discharge: HOME | End: 2023-11-16
Payer: MEDICARE

## 2023-11-13 ENCOUNTER — OFFICE VISIT (OUTPATIENT)
Dept: FAMILY MEDICINE CLINIC | Facility: CLINIC | Age: 58
End: 2023-11-13
Payer: MEDICARE

## 2023-11-13 VITALS
BODY MASS INDEX: 25.71 KG/M2 | OXYGEN SATURATION: 98 % | SYSTOLIC BLOOD PRESSURE: 130 MMHG | WEIGHT: 160 LBS | RESPIRATION RATE: 16 BRPM | DIASTOLIC BLOOD PRESSURE: 78 MMHG | TEMPERATURE: 98.2 F | HEART RATE: 60 BPM | HEIGHT: 66 IN

## 2023-11-13 DIAGNOSIS — I10 PRIMARY HYPERTENSION: ICD-10-CM

## 2023-11-13 DIAGNOSIS — E78.00 HYPERCHOLESTEROLEMIA: ICD-10-CM

## 2023-11-13 DIAGNOSIS — M51.16 LUMBAR DISC DISEASE WITH RADICULOPATHY: ICD-10-CM

## 2023-11-13 DIAGNOSIS — M25.562 LEFT KNEE PAIN, UNSPECIFIED CHRONICITY: ICD-10-CM

## 2023-11-13 DIAGNOSIS — I10 PRIMARY HYPERTENSION: Primary | ICD-10-CM

## 2023-11-13 DIAGNOSIS — Z86.010 HX OF ADENOMATOUS COLONIC POLYPS: ICD-10-CM

## 2023-11-13 DIAGNOSIS — I25.10 CORONARY ARTERY DISEASE INVOLVING NATIVE CORONARY ARTERY OF NATIVE HEART WITHOUT ANGINA PECTORIS: ICD-10-CM

## 2023-11-13 DIAGNOSIS — J44.9 CHRONIC OBSTRUCTIVE PULMONARY DISEASE, UNSPECIFIED COPD TYPE (HCC): ICD-10-CM

## 2023-11-13 DIAGNOSIS — F17.210 CONTINUOUS DEPENDENCE ON CIGARETTE SMOKING: ICD-10-CM

## 2023-11-13 DIAGNOSIS — Z00.00 MEDICARE ANNUAL WELLNESS VISIT, SUBSEQUENT: ICD-10-CM

## 2023-11-13 DIAGNOSIS — Z11.4 ENCOUNTER FOR SCREENING FOR HIV: ICD-10-CM

## 2023-11-13 LAB
ALBUMIN SERPL-MCNC: 3.9 G/DL (ref 3.4–5)
ALBUMIN/GLOB SERPL: 1.2 (ref 0.8–1.7)
ALP SERPL-CCNC: 108 U/L (ref 45–117)
ALT SERPL-CCNC: 25 U/L (ref 13–56)
ANION GAP SERPL CALC-SCNC: 3 MMOL/L (ref 3–18)
APPEARANCE UR: CLEAR
AST SERPL-CCNC: 20 U/L (ref 10–38)
BACTERIA URNS QL MICRO: ABNORMAL /HPF
BASOPHILS # BLD: 0.1 K/UL (ref 0–0.1)
BASOPHILS NFR BLD: 1 % (ref 0–2)
BILIRUB SERPL-MCNC: 0.4 MG/DL (ref 0.2–1)
BILIRUB UR QL: NEGATIVE
BUN SERPL-MCNC: 17 MG/DL (ref 7–18)
BUN/CREAT SERPL: 20 (ref 12–20)
CALCIUM SERPL-MCNC: 9.3 MG/DL (ref 8.5–10.1)
CHLORIDE SERPL-SCNC: 109 MMOL/L (ref 100–111)
CHOLEST SERPL-MCNC: 171 MG/DL
CO2 SERPL-SCNC: 28 MMOL/L (ref 21–32)
COLOR UR: YELLOW
CREAT SERPL-MCNC: 0.84 MG/DL (ref 0.6–1.3)
DIFFERENTIAL METHOD BLD: ABNORMAL
EOSINOPHIL # BLD: 0.3 K/UL (ref 0–0.4)
EOSINOPHIL NFR BLD: 4 % (ref 0–5)
EPITH CASTS URNS QL MICRO: ABNORMAL /LPF (ref 0–5)
ERYTHROCYTE [DISTWIDTH] IN BLOOD BY AUTOMATED COUNT: 13.4 % (ref 11.6–14.5)
GLOBULIN SER CALC-MCNC: 3.3 G/DL (ref 2–4)
GLUCOSE SERPL-MCNC: 98 MG/DL (ref 74–99)
GLUCOSE UR STRIP.AUTO-MCNC: NEGATIVE MG/DL
HCT VFR BLD AUTO: 45.6 % (ref 35–45)
HDLC SERPL-MCNC: 60 MG/DL (ref 40–60)
HDLC SERPL: 2.9 (ref 0–5)
HGB BLD-MCNC: 14.7 G/DL (ref 12–16)
HGB UR QL STRIP: NEGATIVE
IMM GRANULOCYTES # BLD AUTO: 0 K/UL (ref 0–0.04)
IMM GRANULOCYTES NFR BLD AUTO: 0 % (ref 0–0.5)
KETONES UR QL STRIP.AUTO: NEGATIVE MG/DL
LDLC SERPL CALC-MCNC: 99.2 MG/DL (ref 0–100)
LEUKOCYTE ESTERASE UR QL STRIP.AUTO: ABNORMAL
LIPID PANEL: NORMAL
LYMPHOCYTES # BLD: 2.3 K/UL (ref 0.9–3.6)
LYMPHOCYTES NFR BLD: 27 % (ref 21–52)
MCH RBC QN AUTO: 31.1 PG (ref 24–34)
MCHC RBC AUTO-ENTMCNC: 32.2 G/DL (ref 31–37)
MCV RBC AUTO: 96.4 FL (ref 78–100)
MONOCYTES # BLD: 0.8 K/UL (ref 0.05–1.2)
MONOCYTES NFR BLD: 9 % (ref 3–10)
NEUTS SEG # BLD: 5.1 K/UL (ref 1.8–8)
NEUTS SEG NFR BLD: 59 % (ref 40–73)
NITRITE UR QL STRIP.AUTO: NEGATIVE
NRBC # BLD: 0 K/UL (ref 0–0.01)
NRBC BLD-RTO: 0 PER 100 WBC
PH UR STRIP: 5.5 (ref 5–8)
PLATELET # BLD AUTO: 305 K/UL (ref 135–420)
PMV BLD AUTO: 10.8 FL (ref 9.2–11.8)
POTASSIUM SERPL-SCNC: 4.3 MMOL/L (ref 3.5–5.5)
PROT SERPL-MCNC: 7.2 G/DL (ref 6.4–8.2)
PROT UR STRIP-MCNC: NEGATIVE MG/DL
RBC # BLD AUTO: 4.73 M/UL (ref 4.2–5.3)
RBC #/AREA URNS HPF: ABNORMAL /HPF (ref 0–5)
SODIUM SERPL-SCNC: 140 MMOL/L (ref 136–145)
SP GR UR REFRACTOMETRY: 1.02 (ref 1–1.03)
TRIGL SERPL-MCNC: 59 MG/DL
TSH SERPL DL<=0.05 MIU/L-ACNC: 1.99 UIU/ML (ref 0.36–3.74)
UROBILINOGEN UR QL STRIP.AUTO: 0.2 EU/DL (ref 0.2–1)
VLDLC SERPL CALC-MCNC: 11.8 MG/DL
WBC # BLD AUTO: 8.7 K/UL (ref 4.6–13.2)
WBC URNS QL MICRO: ABNORMAL /HPF (ref 0–4)

## 2023-11-13 PROCEDURE — 4004F PT TOBACCO SCREEN RCVD TLK: CPT | Performed by: FAMILY MEDICINE

## 2023-11-13 PROCEDURE — 81001 URINALYSIS AUTO W/SCOPE: CPT

## 2023-11-13 PROCEDURE — 84443 ASSAY THYROID STIM HORMONE: CPT

## 2023-11-13 PROCEDURE — 99213 OFFICE O/P EST LOW 20 MIN: CPT | Performed by: FAMILY MEDICINE

## 2023-11-13 PROCEDURE — 85025 COMPLETE CBC W/AUTO DIFF WBC: CPT

## 2023-11-13 PROCEDURE — 36415 COLL VENOUS BLD VENIPUNCTURE: CPT

## 2023-11-13 PROCEDURE — G8482 FLU IMMUNIZE ORDER/ADMIN: HCPCS | Performed by: FAMILY MEDICINE

## 2023-11-13 PROCEDURE — 90674 CCIIV4 VAC NO PRSV 0.5 ML IM: CPT | Performed by: FAMILY MEDICINE

## 2023-11-13 PROCEDURE — 80061 LIPID PANEL: CPT

## 2023-11-13 PROCEDURE — G8427 DOCREV CUR MEDS BY ELIG CLIN: HCPCS | Performed by: FAMILY MEDICINE

## 2023-11-13 PROCEDURE — 3023F SPIROM DOC REV: CPT | Performed by: FAMILY MEDICINE

## 2023-11-13 PROCEDURE — 80053 COMPREHEN METABOLIC PANEL: CPT

## 2023-11-13 PROCEDURE — G8419 CALC BMI OUT NRM PARAM NOF/U: HCPCS | Performed by: FAMILY MEDICINE

## 2023-11-13 PROCEDURE — 3075F SYST BP GE 130 - 139MM HG: CPT | Performed by: FAMILY MEDICINE

## 2023-11-13 PROCEDURE — 3017F COLORECTAL CA SCREEN DOC REV: CPT | Performed by: FAMILY MEDICINE

## 2023-11-13 PROCEDURE — 3078F DIAST BP <80 MM HG: CPT | Performed by: FAMILY MEDICINE

## 2023-11-13 PROCEDURE — G0439 PPPS, SUBSEQ VISIT: HCPCS | Performed by: FAMILY MEDICINE

## 2023-11-13 PROCEDURE — G0008 ADMIN INFLUENZA VIRUS VAC: HCPCS | Performed by: FAMILY MEDICINE

## 2023-11-13 PROCEDURE — 87389 HIV-1 AG W/HIV-1&-2 AB AG IA: CPT

## 2023-11-13 RX ORDER — LOSARTAN POTASSIUM 25 MG/1
25 TABLET ORAL DAILY
Qty: 100 TABLET | Refills: 3 | Status: SHIPPED | OUTPATIENT
Start: 2023-11-13

## 2023-11-13 RX ORDER — GABAPENTIN 300 MG/1
300 CAPSULE ORAL 3 TIMES DAILY
Qty: 270 CAPSULE | Refills: 1 | Status: SHIPPED | OUTPATIENT
Start: 2023-11-13 | End: 2024-05-11

## 2023-11-13 RX ORDER — PRAVASTATIN SODIUM 80 MG/1
80 TABLET ORAL DAILY
Qty: 100 TABLET | Refills: 3 | Status: SHIPPED | OUTPATIENT
Start: 2023-11-13

## 2023-11-13 ASSESSMENT — PATIENT HEALTH QUESTIONNAIRE - PHQ9
SUM OF ALL RESPONSES TO PHQ QUESTIONS 1-9: 0
SUM OF ALL RESPONSES TO PHQ QUESTIONS 1-9: 0
2. FEELING DOWN, DEPRESSED OR HOPELESS: 0
1. LITTLE INTEREST OR PLEASURE IN DOING THINGS: 0
SUM OF ALL RESPONSES TO PHQ9 QUESTIONS 1 & 2: 0
SUM OF ALL RESPONSES TO PHQ QUESTIONS 1-9: 0
SUM OF ALL RESPONSES TO PHQ QUESTIONS 1-9: 0

## 2023-11-13 ASSESSMENT — ENCOUNTER SYMPTOMS: BACK PAIN: 1

## 2023-11-13 ASSESSMENT — LIFESTYLE VARIABLES
HOW OFTEN DO YOU HAVE A DRINK CONTAINING ALCOHOL: NEVER
HOW MANY STANDARD DRINKS CONTAINING ALCOHOL DO YOU HAVE ON A TYPICAL DAY: PATIENT DOES NOT DRINK

## 2023-11-15 LAB
HIV 1+2 AB+HIV1 P24 AG SERPL QL IA: NONREACTIVE
HIV 1/2 RESULT COMMENT: NORMAL

## 2024-01-09 ASSESSMENT — ENCOUNTER SYMPTOMS
WHEEZING: 1
SHORTNESS OF BREATH: 1
COUGH: 1

## 2024-01-09 NOTE — PROGRESS NOTES
HISTORY OF PRESENT ILLNESS  Lorelei Sierra  is a 58 y.o. y.o. female    She presents for follow-up after evaluation at [facility] emergency department on 2024 with a 1 week history of persistent cough, wheezing and shortness of breath.  Emergency department evaluation revealed some wheezing on exam, chest x-ray was negative for infiltrates and the patient's symptoms improved with nebulizer treatments.  She was diagnosed with a COPD exacerbation and treated with a tapering course of prednisone.    Today she reports that her cough has not improved and she continues to have paroxysms of cough during her office encounter as well.  She denies shortness of breath when not coughing, she has not been experiencing fever or chills.  She notes that since her insurance changed she has had difficulty arranging subspecialty follow-up since previous consultants are no longer on plan.  She is overdue for interval colonoscopy surveillance with a history of tubular adenomas of the colon and for dermatology follow-up of lichen planus.  She indicates that she has had difficulty interacting with her pulmonologist's office as well although he is still on plan for her insurance.        Mr#: [id number]      Past Medical History:   Diagnosis Date    Autoimmune disease (HCC)     Celiac disease     Chronic obstructive pulmonary disease (HCC)     Colon polyps     Endocrine disease     Heart attack (HCC)     2017    Hypertension     Immune deficiency disorder (HCC)     Pars defect     Seizure (HCC)     last seizure 10 years ago per patient. it was a grand mal seizure    Seizures (HCC)        Past Surgical History:   Procedure Laterality Date    APPENDECTOMY       SECTION      COLONOSCOPY N/A 2017    COLONOSCOPY, SURVEILLANCE by Kilo Fernandez MD multiple tubular adenomas, 3-year follow-up    COLONOSCOPY      HYSTERECTOMY (CERVIX STATUS UNKNOWN)      LAPAROTOMY      REZA AND BSO (CERVIX REMOVED)      TONSILLECTOMY

## 2024-01-10 ENCOUNTER — OFFICE VISIT (OUTPATIENT)
Dept: FAMILY MEDICINE CLINIC | Facility: CLINIC | Age: 59
End: 2024-01-10
Payer: MEDICARE

## 2024-01-10 VITALS
SYSTOLIC BLOOD PRESSURE: 120 MMHG | HEART RATE: 78 BPM | HEIGHT: 66 IN | BODY MASS INDEX: 26.2 KG/M2 | OXYGEN SATURATION: 97 % | TEMPERATURE: 98.1 F | DIASTOLIC BLOOD PRESSURE: 80 MMHG | WEIGHT: 163 LBS | RESPIRATION RATE: 16 BRPM

## 2024-01-10 DIAGNOSIS — L43.9 LICHEN PLANUS: ICD-10-CM

## 2024-01-10 DIAGNOSIS — Z86.010 HX OF ADENOMATOUS COLONIC POLYPS: ICD-10-CM

## 2024-01-10 DIAGNOSIS — J44.1 COPD WITH ACUTE EXACERBATION (HCC): Primary | ICD-10-CM

## 2024-01-10 PROCEDURE — 4004F PT TOBACCO SCREEN RCVD TLK: CPT | Performed by: FAMILY MEDICINE

## 2024-01-10 PROCEDURE — 99214 OFFICE O/P EST MOD 30 MIN: CPT | Performed by: FAMILY MEDICINE

## 2024-01-10 PROCEDURE — G8419 CALC BMI OUT NRM PARAM NOF/U: HCPCS | Performed by: FAMILY MEDICINE

## 2024-01-10 PROCEDURE — 3017F COLORECTAL CA SCREEN DOC REV: CPT | Performed by: FAMILY MEDICINE

## 2024-01-10 PROCEDURE — 3074F SYST BP LT 130 MM HG: CPT | Performed by: FAMILY MEDICINE

## 2024-01-10 PROCEDURE — G8427 DOCREV CUR MEDS BY ELIG CLIN: HCPCS | Performed by: FAMILY MEDICINE

## 2024-01-10 PROCEDURE — 3023F SPIROM DOC REV: CPT | Performed by: FAMILY MEDICINE

## 2024-01-10 PROCEDURE — G8482 FLU IMMUNIZE ORDER/ADMIN: HCPCS | Performed by: FAMILY MEDICINE

## 2024-01-10 PROCEDURE — 3079F DIAST BP 80-89 MM HG: CPT | Performed by: FAMILY MEDICINE

## 2024-01-10 RX ORDER — PREDNISONE 10 MG/1
TABLET ORAL
COMMUNITY

## 2024-01-10 RX ORDER — DOXYCYCLINE HYCLATE 100 MG
TABLET ORAL
Qty: 20 TABLET | Refills: 0 | Status: SHIPPED | OUTPATIENT
Start: 2024-01-10

## 2024-01-10 RX ORDER — BENZONATATE 200 MG/1
200 CAPSULE ORAL 3 TIMES DAILY PRN
Qty: 30 CAPSULE | Refills: 1 | Status: SHIPPED | OUTPATIENT
Start: 2024-01-10

## 2024-01-10 RX ORDER — ALBUTEROL SULFATE 2.5 MG/3ML
2.5 SOLUTION RESPIRATORY (INHALATION) EVERY 4 HOURS PRN
COMMUNITY

## 2024-01-10 ASSESSMENT — PATIENT HEALTH QUESTIONNAIRE - PHQ9
SUM OF ALL RESPONSES TO PHQ9 QUESTIONS 1 & 2: 0
SUM OF ALL RESPONSES TO PHQ QUESTIONS 1-9: 0
SUM OF ALL RESPONSES TO PHQ QUESTIONS 1-9: 0
1. LITTLE INTEREST OR PLEASURE IN DOING THINGS: 0
SUM OF ALL RESPONSES TO PHQ QUESTIONS 1-9: 0
2. FEELING DOWN, DEPRESSED OR HOPELESS: 0
SUM OF ALL RESPONSES TO PHQ QUESTIONS 1-9: 0

## 2024-01-10 NOTE — PROGRESS NOTES
Lorelei Sierra is a 58 y.o. female (: 1965) presenting to address:    Chief Complaint   Patient presents with    Cough     Seen in ER 24 . 4 days left of steroids . Trying OTC not helping woke up worse today .     Shortness of Breath    nebulizer supplies kit .      Wants order sent to [employer] mask tubing and cup fax # [phone] .     Referral - General     Derm and gastro . Gastro Dr Joe Virk. Derm Abdifatah Golden . Still didn't get why pulmonary wanted to her to see Cardiology . Insurance gave DR Kilo Ball        Vitals:    01/10/24 0946   BP: 120/80   Pulse: 78   Resp: 16   Temp: 98.1 °F (36.7 °C)   SpO2: 97%       Coordination of Care Questionaire:   1. \"Have you been to the ER, urgent care clinic since your last visit?  Hospitalized since your last visit?\" No    2. \"Have you seen or consulted any other health care providers outside of the [facility] since your last visit?\" No     3. For patients aged 45-75: Has the patient had a colonoscopy / FIT/ Cologuard? Yes - Care Gap present. Most recent result on file      If the patient is female:    4. For patients aged 40-74: Has the patient had a mammogram within the past 2 years? Yes - no Care Gap present      5. For patients aged 21-65: Has the patient had a pap smear? N/a hysterectomy     Advanced Directive:   1. Do you have an Advanced Directive? No    2. Would you like information on Advanced Directives? No

## 2024-01-10 NOTE — PATIENT INSTRUCTIONS
Current Status:  Continues with unrelenting cough, still on prednisone prescribed at the emergency department, already using trilogy Ellipta inhaler and as needed albuterol, unable to tolerate codeine  History of tubular adenomas of the colon overdue for colonoscopy since 2022  Lichen planus with persistent rash on the feet, overdue for dermatology evaluation    Health Maintenance Recommendations:  Keep current with COVID-19 immunization guidelines  Colonoscopy overdue since 7/2017    Plan:  Empiric coverage with doxycycline 100 mg twice daily with food x 10 days  Complete the tapering course of prednisone prescribed at the emergency department  Continue current inhaler/nebulizer use  Benzonatate capsules up to 3 times daily as needed for cough  Schedule pulmonary medicine follow-up  Colorectal surgery referral for colonoscopy  Dermatology referral  Lab and Medicare wellness evaluation due in November 2024.

## 2024-05-21 DIAGNOSIS — E78.00 HYPERCHOLESTEROLEMIA: ICD-10-CM

## 2024-05-21 RX ORDER — PRAVASTATIN SODIUM 80 MG/1
80 TABLET ORAL DAILY
Qty: 90 TABLET | Refills: 2 | Status: SHIPPED | OUTPATIENT
Start: 2024-05-21

## 2024-05-21 NOTE — TELEPHONE ENCOUNTER
Pt called with Rx Refill Request.  Requested Prescriptions     Pending Prescriptions Disp Refills    pravastatin (PRAVACHOL) 80 MG tablet [Pharmacy Med Name: Pravastatin Sodium 80 MG Oral Tablet] 90 tablet 0     Sig: Take 1 tablet by mouth once daily

## 2024-07-10 DIAGNOSIS — E78.00 HYPERCHOLESTEROLEMIA: ICD-10-CM

## 2024-07-10 DIAGNOSIS — Z86.010 HX OF ADENOMATOUS COLONIC POLYPS: ICD-10-CM

## 2024-07-10 DIAGNOSIS — M51.16 LUMBAR DISC DISEASE WITH RADICULOPATHY: ICD-10-CM

## 2024-07-10 DIAGNOSIS — I10 PRIMARY HYPERTENSION: Primary | ICD-10-CM

## 2024-07-10 PROBLEM — Z86.0101 HX OF ADENOMATOUS COLONIC POLYPS: Status: ACTIVE | Noted: 2024-07-10

## 2024-07-10 RX ORDER — GABAPENTIN 300 MG/1
CAPSULE ORAL
Qty: 90 CAPSULE | Refills: 3 | Status: SHIPPED | OUTPATIENT
Start: 2024-07-10 | End: 2024-11-07

## 2024-07-10 NOTE — TELEPHONE ENCOUNTER
Please advise that gabapentin has been made refillable for 4 months.  She will be due for her fasting lab appointment followed by Medicare wellness evaluation appointment in November.  Since gabapentin is a controlled medication it is actually necessary for her to be seen every 6 months.

## 2024-07-29 PROBLEM — Z87.898 HISTORY OF SEIZURE: Status: ACTIVE | Noted: 2024-07-29

## 2024-07-29 PROBLEM — R56.9 SEIZURE (HCC): Status: RESOLVED | Noted: 2017-03-20 | Resolved: 2024-07-29

## 2024-07-29 NOTE — PROGRESS NOTES
HISTORY OF PRESENT ILLNESS  Lorelei Sierra  is a 58 y.o. y.o. female    She presents with multiple concerns.  She is concerned because she has discovered a \"knot\" protruding from her abdomen.  She finds this especially troubling since apparently in the past her mother had somewhat similar symptoms and was finally diagnosed with metastatic carcinoma of the colon.  The patient also has a history of tubular adenomas of the colon and is overdue for screening colonoscopy  She reports persistent/worsening symptoms associated with left lumbar and left leg pain.  She has been treated with physical therapy in the past without significant improvement.  She feels as though this contributes to frequent falls.    She noticed \"veins popping out around her knees\" after a long drive (12 hours).    She incidentally reports that she has pulmonary medicine follow-up scheduled in the next week or so.  She has a history of COPD and pulmonary nodules.        Mr#: [id number]      Past Medical History:   Diagnosis Date    Autoimmune disease (HCC)     Celiac disease     Chronic obstructive pulmonary disease (HCC)     Colon polyps     Endocrine disease     Heart attack (HCC)         Hypertension     Immune deficiency disorder (HCC)     Pars defect     Seizure (HCC)     last seizure 10 years ago per patient. it was a grand mal seizure    Seizures (HCC)        Past Surgical History:   Procedure Laterality Date    APPENDECTOMY       SECTION      COLONOSCOPY N/A 2017    COLONOSCOPY, SURVEILLANCE by Kilo Fernandez MD multiple tubular adenomas, 3-year follow-up    COLONOSCOPY      HYSTERECTOMY (CERVIX STATUS UNKNOWN)      LAPAROTOMY      REZA AND BSO (CERVIX REMOVED)      TONSILLECTOMY      UPPER GASTROINTESTINAL ENDOSCOPY         Family History   Problem Relation Age of Onset    Diabetes Mother     Diabetes Maternal Grandfather     Colon Cancer Mother     Heart Attack Mother     Diabetes Father     Hypertension Father     Heart

## 2024-07-30 ENCOUNTER — OFFICE VISIT (OUTPATIENT)
Dept: FAMILY MEDICINE CLINIC | Facility: CLINIC | Age: 59
End: 2024-07-30
Payer: MEDICARE

## 2024-07-30 VITALS
SYSTOLIC BLOOD PRESSURE: 130 MMHG | OXYGEN SATURATION: 96 % | HEIGHT: 66 IN | DIASTOLIC BLOOD PRESSURE: 80 MMHG | HEART RATE: 70 BPM | WEIGHT: 162 LBS | BODY MASS INDEX: 26.03 KG/M2 | RESPIRATION RATE: 16 BRPM | TEMPERATURE: 97.7 F

## 2024-07-30 DIAGNOSIS — Z86.010 HX OF ADENOMATOUS COLONIC POLYPS: ICD-10-CM

## 2024-07-30 DIAGNOSIS — E78.00 HYPERCHOLESTEROLEMIA: ICD-10-CM

## 2024-07-30 DIAGNOSIS — R91.8 PULMONARY NODULES: ICD-10-CM

## 2024-07-30 DIAGNOSIS — M54.42 CHRONIC BILATERAL LOW BACK PAIN WITH LEFT-SIDED SCIATICA: Primary | ICD-10-CM

## 2024-07-30 DIAGNOSIS — G89.29 CHRONIC BILATERAL LOW BACK PAIN WITH LEFT-SIDED SCIATICA: Primary | ICD-10-CM

## 2024-07-30 DIAGNOSIS — J41.0 SIMPLE CHRONIC BRONCHITIS (HCC): ICD-10-CM

## 2024-07-30 DIAGNOSIS — Z12.31 BREAST CANCER SCREENING BY MAMMOGRAM: ICD-10-CM

## 2024-07-30 DIAGNOSIS — K43.9 VENTRAL HERNIA WITHOUT OBSTRUCTION OR GANGRENE: ICD-10-CM

## 2024-07-30 DIAGNOSIS — M53.3 CHRONIC LEFT SACROILIAC PAIN: ICD-10-CM

## 2024-07-30 DIAGNOSIS — G89.29 CHRONIC LEFT SACROILIAC PAIN: ICD-10-CM

## 2024-07-30 DIAGNOSIS — I10 PRIMARY HYPERTENSION: Primary | ICD-10-CM

## 2024-07-30 PROCEDURE — 3017F COLORECTAL CA SCREEN DOC REV: CPT | Performed by: FAMILY MEDICINE

## 2024-07-30 PROCEDURE — G8419 CALC BMI OUT NRM PARAM NOF/U: HCPCS | Performed by: FAMILY MEDICINE

## 2024-07-30 PROCEDURE — 3075F SYST BP GE 130 - 139MM HG: CPT | Performed by: FAMILY MEDICINE

## 2024-07-30 PROCEDURE — G8427 DOCREV CUR MEDS BY ELIG CLIN: HCPCS | Performed by: FAMILY MEDICINE

## 2024-07-30 PROCEDURE — 4004F PT TOBACCO SCREEN RCVD TLK: CPT | Performed by: FAMILY MEDICINE

## 2024-07-30 PROCEDURE — 99214 OFFICE O/P EST MOD 30 MIN: CPT | Performed by: FAMILY MEDICINE

## 2024-07-30 PROCEDURE — 3079F DIAST BP 80-89 MM HG: CPT | Performed by: FAMILY MEDICINE

## 2024-07-30 PROCEDURE — 3023F SPIROM DOC REV: CPT | Performed by: FAMILY MEDICINE

## 2024-07-30 ASSESSMENT — ENCOUNTER SYMPTOMS
ABDOMINAL PAIN: 1
BACK PAIN: 1

## 2024-07-30 NOTE — PATIENT INSTRUCTIONS
Current Status:  Painful left back, left leg pain, exam consistent with sciatica and possible sacroiliitis  Tender ventral hernia without obstruction  History of COPD and pulmonary nodules followed by pulmonary medicine, appears to be overdue for chest CT, pulmonary medicine appointment already scheduled next month  History of colonic tubular adenomas overdue for colonoscopy surveillance    Health Maintenance Recommendations:  Influenza immunization recommended every fall  Hepatitis B immunization series previously declined  Consider the Shingrix immunization series to reduce the risk of shingles, available at the pharmacy  Mammogram due since May 2024-[place]  Chest CT due since 2022  Colonoscopy due since July 2024 follow-up with a history of tubular adenomas of the colon-referred    Plan:  Follow-up with pulmonary medicine as scheduled and discuss results of most recent chest CT  Stop smoking  Referral for physical medicine evaluation regarding suspected sciatica and sacroiliitis  General surgery referral regarding ventral hernia  Please schedule fasting lab appointment followed by Medicare wellness evaluation appointment in November, return sooner with any problems  Please always arrive at least 15 minutes before your scheduled appointment time.

## 2024-07-30 NOTE — PROGRESS NOTES
Lorelei Sierra is a 58 y.o. female (: 1965) presenting to address:    Chief Complaint   Patient presents with    Knee Pain     Left knee still having trouble getting worse pain shooting down into back of leg and up into hip .     Discuss Medications     Only takes gabapentin at night as needed but doesn't feel it helps .     Skin Problem     Veins on inner side of knees .     Referral - General     Needs a referral for gastro for celiac disease        Vitals:    24 0909   BP: 130/80   Pulse: 70   Resp: 16   Temp: 97.7 °F (36.5 °C)   SpO2: 96%       \"Have you been to the ER, urgent care clinic since your last visit?  Hospitalized since your last visit?\"    Yes hospital see [facility]     “Have you seen or consulted any other health care providers outside of [facility] since your last visit?”    Yes pulmonary     “Have you had a colorectal cancer screening such as a colonoscopy/FIT/Cologuard?    NO    Date of last Colonoscopy: 2017  No cologuard on file  No FIT/FOBT on file   No flexible sigmoidoscopy on file        Have you had a mammogram?”   NO    Date of last Mammogram: 2023

## 2024-08-08 ENCOUNTER — PREP FOR PROCEDURE (OUTPATIENT)
Age: 59
End: 2024-08-08

## 2024-08-08 ENCOUNTER — OFFICE VISIT (OUTPATIENT)
Age: 59
End: 2024-08-08

## 2024-08-08 VITALS
HEART RATE: 75 BPM | SYSTOLIC BLOOD PRESSURE: 158 MMHG | BODY MASS INDEX: 26.03 KG/M2 | DIASTOLIC BLOOD PRESSURE: 82 MMHG | TEMPERATURE: 98 F | WEIGHT: 162 LBS | HEIGHT: 66 IN | OXYGEN SATURATION: 98 %

## 2024-08-08 DIAGNOSIS — I25.10 CORONARY ARTERY DISEASE INVOLVING NATIVE CORONARY ARTERY OF NATIVE HEART WITHOUT ANGINA PECTORIS: ICD-10-CM

## 2024-08-08 DIAGNOSIS — M51.16 LUMBAR DISC DISEASE WITH RADICULOPATHY: ICD-10-CM

## 2024-08-08 DIAGNOSIS — K43.6 INCARCERATED VENTRAL HERNIA: ICD-10-CM

## 2024-08-08 DIAGNOSIS — K43.6 INCARCERATED VENTRAL HERNIA: Primary | ICD-10-CM

## 2024-08-08 DIAGNOSIS — M54.31 SCIATICA OF RIGHT SIDE: ICD-10-CM

## 2024-08-08 DIAGNOSIS — J44.1 CHRONIC OBSTRUCTIVE PULMONARY DISEASE WITH ACUTE EXACERBATION (HCC): ICD-10-CM

## 2024-08-08 DIAGNOSIS — I10 PRIMARY HYPERTENSION: ICD-10-CM

## 2024-08-08 NOTE — PROGRESS NOTES
Lorelei Sierra is a 58 y.o. female (: 1965) presenting to address:    Chief Complaint   Patient presents with    New Patient     Ventral hernia.  Referred by Dr Twin Singh       Medication list and allergies have been reviewed with Lorelei Sierra and updated as of today's date.     I have gone over all Medical, Surgical and Social History with Lorelei Sierra and updated/added the information accordingly.     
disease (HCC)     Celiac disease     Chronic obstructive pulmonary disease (HCC)     Colon polyps     Endocrine disease     Heart attack (HCC)     2017    Hypertension     Immune deficiency disorder (HCC)     Pars defect     Seizure (HCC)     last seizure 10 years ago per patient. it was a grand mal seizure    Seizures (HCC)       Past Surgical History:   Procedure Laterality Date    APPENDECTOMY       SECTION      COLONOSCOPY N/A 2017    COLONOSCOPY, SURVEILLANCE by Kilo Fernandez MD multiple tubular adenomas, 3-year follow-up    COLONOSCOPY      HYSTERECTOMY (CERVIX STATUS UNKNOWN)      LAPAROTOMY      REZA AND BSO (CERVIX REMOVED)      TONSILLECTOMY      UPPER GASTROINTESTINAL ENDOSCOPY        Social History     Tobacco Use    Smoking status: Some Days     Current packs/day: 1.00     Average packs/day: 1 pack/day for 46.0 years (46.0 ttl pk-yrs)     Types: Cigarettes    Smokeless tobacco: Never   Substance Use Topics    Alcohol use: Never      Social History     Tobacco Use   Smoking Status Some Days    Current packs/day: 1.00    Average packs/day: 1 pack/day for 46.0 years (46.0 ttl pk-yrs)    Types: Cigarettes   Smokeless Tobacco Never     Family History   Problem Relation Age of Onset    Diabetes Mother     Diabetes Maternal Grandfather     Colon Cancer Mother     Heart Attack Mother     Diabetes Father     Hypertension Father     Heart Disease Father     Heart Attack Father     Heart Disease Sister     Heart Attack Sister     Diabetes Sister     Diabetes Maternal Grandmother     Heart Attack Maternal Grandmother     Cancer Mother     Diabetes Paternal Grandfather     Diabetes Paternal Grandmother     Hypertension Mother       Current Outpatient Medications   Medication Sig    gabapentin (NEURONTIN) 300 MG capsule TAKE 1 CAPSULE BY MOUTH THREE TIMES DAILY (MAX  DAILY  AMOUNT  3  CAPSULES) (Patient taking differently: Take 1 capsule by mouth nightly as needed.)    pravastatin (PRAVACHOL) 80 MG

## 2024-08-12 ENCOUNTER — ANESTHESIA EVENT (OUTPATIENT)
Facility: HOSPITAL | Age: 59
End: 2024-08-12
Payer: MEDICARE

## 2024-08-14 ENCOUNTER — HOSPITAL ENCOUNTER (OUTPATIENT)
Facility: HOSPITAL | Age: 59
Setting detail: OUTPATIENT SURGERY
Discharge: HOME | End: 2024-08-14
Attending: SURGERY | Admitting: SURGERY
Payer: MEDICARE

## 2024-08-14 ENCOUNTER — ANESTHESIA (OUTPATIENT)
Facility: HOSPITAL | Age: 59
End: 2024-08-14
Payer: MEDICARE

## 2024-08-14 VITALS
OXYGEN SATURATION: 95 % | HEIGHT: 65 IN | TEMPERATURE: 97.6 F | RESPIRATION RATE: 16 BRPM | DIASTOLIC BLOOD PRESSURE: 61 MMHG | SYSTOLIC BLOOD PRESSURE: 104 MMHG | HEART RATE: 47 BPM | WEIGHT: 161.6 LBS | BODY MASS INDEX: 26.92 KG/M2

## 2024-08-14 DIAGNOSIS — Z98.890 S/P HERNIA REPAIR: Primary | ICD-10-CM

## 2024-08-14 DIAGNOSIS — Z87.19 S/P HERNIA REPAIR: Primary | ICD-10-CM

## 2024-08-14 LAB
AMPHET UR QL SCN: NEGATIVE
BARBITURATES UR QL SCN: NEGATIVE
BENZODIAZ UR QL: NEGATIVE
CANNABINOIDS UR QL SCN: POSITIVE
COCAINE UR QL SCN: NEGATIVE
Lab: ABNORMAL
METHADONE UR QL: NEGATIVE
OPIATES UR QL: NEGATIVE
PCP UR QL: NEGATIVE

## 2024-08-14 PROCEDURE — 3600000009 HC SURGERY ROBOT BASE: Performed by: SURGERY

## 2024-08-14 PROCEDURE — 7100000001 HC PACU RECOVERY - ADDTL 15 MIN: Performed by: SURGERY

## 2024-08-14 PROCEDURE — 6360000002 HC RX W HCPCS: Performed by: ANESTHESIOLOGY

## 2024-08-14 PROCEDURE — 2500000003 HC RX 250 WO HCPCS: Performed by: NURSE ANESTHETIST, CERTIFIED REGISTERED

## 2024-08-14 PROCEDURE — 80307 DRUG TEST PRSMV CHEM ANLYZR: CPT

## 2024-08-14 PROCEDURE — 6360000002 HC RX W HCPCS: Performed by: NURSE ANESTHETIST, CERTIFIED REGISTERED

## 2024-08-14 PROCEDURE — 7100000011 HC PHASE II RECOVERY - ADDTL 15 MIN: Performed by: SURGERY

## 2024-08-14 PROCEDURE — 7100000000 HC PACU RECOVERY - FIRST 15 MIN: Performed by: SURGERY

## 2024-08-14 PROCEDURE — 7100000010 HC PHASE II RECOVERY - FIRST 15 MIN: Performed by: SURGERY

## 2024-08-14 PROCEDURE — 6360000002 HC RX W HCPCS: Performed by: SURGERY

## 2024-08-14 PROCEDURE — 3700000001 HC ADD 15 MINUTES (ANESTHESIA): Performed by: SURGERY

## 2024-08-14 PROCEDURE — 6370000000 HC RX 637 (ALT 250 FOR IP): Performed by: ANESTHESIOLOGY

## 2024-08-14 PROCEDURE — 2709999900 HC NON-CHARGEABLE SUPPLY: Performed by: SURGERY

## 2024-08-14 PROCEDURE — 2580000003 HC RX 258: Performed by: NURSE ANESTHETIST, CERTIFIED REGISTERED

## 2024-08-14 PROCEDURE — S2900 ROBOTIC SURGICAL SYSTEM: HCPCS | Performed by: SURGERY

## 2024-08-14 PROCEDURE — 3700000000 HC ANESTHESIA ATTENDED CARE: Performed by: SURGERY

## 2024-08-14 PROCEDURE — 2580000003 HC RX 258: Performed by: SURGERY

## 2024-08-14 PROCEDURE — 2500000003 HC RX 250 WO HCPCS: Performed by: SURGERY

## 2024-08-14 PROCEDURE — C1781 MESH (IMPLANTABLE): HCPCS | Performed by: SURGERY

## 2024-08-14 PROCEDURE — 3600000019 HC SURGERY ROBOT ADDTL 15MIN: Performed by: SURGERY

## 2024-08-14 PROCEDURE — 49592 RPR AA HRN 1ST < 3 NCR/STRN: CPT | Performed by: SURGERY

## 2024-08-14 DEVICE — MESH SURG DIA4.5IN CIR SEPRA TECHNOLOGY VENTRALIGHT: Type: IMPLANTABLE DEVICE | Site: ABDOMEN | Status: FUNCTIONAL

## 2024-08-14 RX ORDER — DIPHENHYDRAMINE HYDROCHLORIDE 50 MG/ML
12.5 INJECTION INTRAMUSCULAR; INTRAVENOUS
Status: DISCONTINUED | OUTPATIENT
Start: 2024-08-14 | End: 2024-08-14 | Stop reason: HOSPADM

## 2024-08-14 RX ORDER — HYDROMORPHONE HYDROCHLORIDE 2 MG/1
2 TABLET ORAL EVERY 12 HOURS PRN
Qty: 12 TABLET | Refills: 0 | Status: SHIPPED | OUTPATIENT
Start: 2024-08-14 | End: 2024-08-19

## 2024-08-14 RX ORDER — PROPOFOL 10 MG/ML
INJECTION, EMULSION INTRAVENOUS PRN
Status: DISCONTINUED | OUTPATIENT
Start: 2024-08-14 | End: 2024-08-14 | Stop reason: SDUPTHER

## 2024-08-14 RX ORDER — ONDANSETRON 2 MG/ML
4 INJECTION INTRAMUSCULAR; INTRAVENOUS
Status: COMPLETED | OUTPATIENT
Start: 2024-08-14 | End: 2024-08-14

## 2024-08-14 RX ORDER — ROCURONIUM BROMIDE 10 MG/ML
INJECTION, SOLUTION INTRAVENOUS PRN
Status: DISCONTINUED | OUTPATIENT
Start: 2024-08-14 | End: 2024-08-14 | Stop reason: SDUPTHER

## 2024-08-14 RX ORDER — SODIUM CHLORIDE, SODIUM LACTATE, POTASSIUM CHLORIDE, CALCIUM CHLORIDE 600; 310; 30; 20 MG/100ML; MG/100ML; MG/100ML; MG/100ML
500 INJECTION, SOLUTION INTRAVENOUS ONCE
Status: DISCONTINUED | OUTPATIENT
Start: 2024-08-14 | End: 2024-08-14 | Stop reason: HOSPADM

## 2024-08-14 RX ORDER — DEXAMETHASONE SODIUM PHOSPHATE 4 MG/ML
INJECTION, SOLUTION INTRA-ARTICULAR; INTRALESIONAL; INTRAMUSCULAR; INTRAVENOUS; SOFT TISSUE PRN
Status: DISCONTINUED | OUTPATIENT
Start: 2024-08-14 | End: 2024-08-14 | Stop reason: SDUPTHER

## 2024-08-14 RX ORDER — NALOXONE HYDROCHLORIDE 0.4 MG/ML
INJECTION, SOLUTION INTRAMUSCULAR; INTRAVENOUS; SUBCUTANEOUS PRN
Status: DISCONTINUED | OUTPATIENT
Start: 2024-08-14 | End: 2024-08-14 | Stop reason: HOSPADM

## 2024-08-14 RX ORDER — SUCCINYLCHOLINE/SOD CL,ISO/PF 100 MG/5ML
SYRINGE (ML) INTRAVENOUS PRN
Status: DISCONTINUED | OUTPATIENT
Start: 2024-08-14 | End: 2024-08-14 | Stop reason: SDUPTHER

## 2024-08-14 RX ORDER — ONDANSETRON 2 MG/ML
INJECTION INTRAMUSCULAR; INTRAVENOUS PRN
Status: DISCONTINUED | OUTPATIENT
Start: 2024-08-14 | End: 2024-08-14 | Stop reason: SDUPTHER

## 2024-08-14 RX ORDER — LIDOCAINE HYDROCHLORIDE 10 MG/ML
1 INJECTION, SOLUTION EPIDURAL; INFILTRATION; INTRACAUDAL; PERINEURAL
Status: DISCONTINUED | OUTPATIENT
Start: 2024-08-14 | End: 2024-08-14 | Stop reason: HOSPADM

## 2024-08-14 RX ORDER — GLYCOPYRROLATE 0.2 MG/ML
INJECTION INTRAMUSCULAR; INTRAVENOUS PRN
Status: DISCONTINUED | OUTPATIENT
Start: 2024-08-14 | End: 2024-08-14 | Stop reason: SDUPTHER

## 2024-08-14 RX ORDER — SODIUM CHLORIDE 0.9 % (FLUSH) 0.9 %
5-40 SYRINGE (ML) INJECTION PRN
Status: DISCONTINUED | OUTPATIENT
Start: 2024-08-14 | End: 2024-08-14 | Stop reason: HOSPADM

## 2024-08-14 RX ORDER — DEXMEDETOMIDINE HYDROCHLORIDE 100 UG/ML
INJECTION, SOLUTION INTRAVENOUS PRN
Status: DISCONTINUED | OUTPATIENT
Start: 2024-08-14 | End: 2024-08-14 | Stop reason: SDUPTHER

## 2024-08-14 RX ORDER — NEOSTIGMINE METHYLSULFATE 1 MG/ML
INJECTION, SOLUTION INTRAVENOUS PRN
Status: DISCONTINUED | OUTPATIENT
Start: 2024-08-14 | End: 2024-08-14 | Stop reason: SDUPTHER

## 2024-08-14 RX ORDER — MIDAZOLAM HYDROCHLORIDE 1 MG/ML
INJECTION INTRAMUSCULAR; INTRAVENOUS PRN
Status: DISCONTINUED | OUTPATIENT
Start: 2024-08-14 | End: 2024-08-14 | Stop reason: SDUPTHER

## 2024-08-14 RX ORDER — FENTANYL CITRATE 50 UG/ML
50 INJECTION, SOLUTION INTRAMUSCULAR; INTRAVENOUS EVERY 5 MIN PRN
Status: DISCONTINUED | OUTPATIENT
Start: 2024-08-14 | End: 2024-08-14 | Stop reason: HOSPADM

## 2024-08-14 RX ORDER — FENTANYL CITRATE 50 UG/ML
INJECTION, SOLUTION INTRAMUSCULAR; INTRAVENOUS PRN
Status: DISCONTINUED | OUTPATIENT
Start: 2024-08-14 | End: 2024-08-14 | Stop reason: SDUPTHER

## 2024-08-14 RX ORDER — SODIUM CHLORIDE, SODIUM LACTATE, POTASSIUM CHLORIDE, CALCIUM CHLORIDE 600; 310; 30; 20 MG/100ML; MG/100ML; MG/100ML; MG/100ML
INJECTION, SOLUTION INTRAVENOUS CONTINUOUS
Status: DISCONTINUED | OUTPATIENT
Start: 2024-08-14 | End: 2024-08-14 | Stop reason: HOSPADM

## 2024-08-14 RX ORDER — OXYCODONE HYDROCHLORIDE 10 MG/1
10 TABLET ORAL ONCE
Status: COMPLETED | OUTPATIENT
Start: 2024-08-14 | End: 2024-08-14

## 2024-08-14 RX ORDER — LIDOCAINE HYDROCHLORIDE 20 MG/ML
INJECTION, SOLUTION EPIDURAL; INFILTRATION; INTRACAUDAL; PERINEURAL PRN
Status: DISCONTINUED | OUTPATIENT
Start: 2024-08-14 | End: 2024-08-14 | Stop reason: SDUPTHER

## 2024-08-14 RX ORDER — KETOROLAC TROMETHAMINE 15 MG/ML
15 INJECTION, SOLUTION INTRAMUSCULAR; INTRAVENOUS ONCE
Status: COMPLETED | OUTPATIENT
Start: 2024-08-14 | End: 2024-08-14

## 2024-08-14 RX ADMIN — LIDOCAINE HYDROCHLORIDE 50 MG: 20 INJECTION, SOLUTION EPIDURAL; INFILTRATION; INTRACAUDAL; PERINEURAL at 07:30

## 2024-08-14 RX ADMIN — WATER 2000 MG: 1 INJECTION INTRAMUSCULAR; INTRAVENOUS; SUBCUTANEOUS at 07:35

## 2024-08-14 RX ADMIN — DEXMEDETOMIDINE HYDROCHLORIDE 4 MCG: 100 INJECTION, SOLUTION INTRAVENOUS at 07:30

## 2024-08-14 RX ADMIN — PROPOFOL 200 MG: 10 INJECTION, EMULSION INTRAVENOUS at 07:31

## 2024-08-14 RX ADMIN — FENTANYL CITRATE 50 MCG: 50 INJECTION INTRAMUSCULAR; INTRAVENOUS at 08:31

## 2024-08-14 RX ADMIN — Medication 100 MG: at 07:31

## 2024-08-14 RX ADMIN — KETOROLAC TROMETHAMINE 15 MG: 15 INJECTION, SOLUTION INTRAMUSCULAR; INTRAVENOUS at 09:55

## 2024-08-14 RX ADMIN — ONDANSETRON 4 MG: 2 INJECTION INTRAMUSCULAR; INTRAVENOUS at 07:59

## 2024-08-14 RX ADMIN — FENTANYL CITRATE 100 MCG: 50 INJECTION INTRAMUSCULAR; INTRAVENOUS at 07:30

## 2024-08-14 RX ADMIN — SODIUM CHLORIDE, PRESERVATIVE FREE 20 MG: 5 INJECTION INTRAVENOUS at 06:42

## 2024-08-14 RX ADMIN — GLYCOPYRROLATE 0.4 MG: 0.2 INJECTION INTRAMUSCULAR; INTRAVENOUS at 08:03

## 2024-08-14 RX ADMIN — ROCURONIUM BROMIDE 20 MG: 10 INJECTION, SOLUTION INTRAVENOUS at 07:36

## 2024-08-14 RX ADMIN — Medication 3 MG: at 08:03

## 2024-08-14 RX ADMIN — DEXAMETHASONE SODIUM PHOSPHATE 4 MG: 4 INJECTION INTRA-ARTICULAR; INTRALESIONAL; INTRAMUSCULAR; INTRAVENOUS; SOFT TISSUE at 07:42

## 2024-08-14 RX ADMIN — MIDAZOLAM 2 MG: 1 INJECTION, SOLUTION INTRAMUSCULAR; INTRAVENOUS at 07:26

## 2024-08-14 RX ADMIN — ONDANSETRON 4 MG: 2 INJECTION INTRAMUSCULAR; INTRAVENOUS at 10:29

## 2024-08-14 RX ADMIN — SODIUM CHLORIDE, POTASSIUM CHLORIDE, SODIUM LACTATE AND CALCIUM CHLORIDE: 600; 310; 30; 20 INJECTION, SOLUTION INTRAVENOUS at 06:40

## 2024-08-14 RX ADMIN — OXYCODONE HYDROCHLORIDE 10 MG: 10 TABLET ORAL at 09:22

## 2024-08-14 ASSESSMENT — PAIN DESCRIPTION - ORIENTATION
ORIENTATION: UPPER
ORIENTATION: UPPER
ORIENTATION: MID;LOWER

## 2024-08-14 ASSESSMENT — PAIN SCALES - GENERAL
PAINLEVEL_OUTOF10: 5
PAINLEVEL_OUTOF10: 8
PAINLEVEL_OUTOF10: 9
PAINLEVEL_OUTOF10: 6
PAINLEVEL_OUTOF10: 6
PAINLEVEL_OUTOF10: 7

## 2024-08-14 ASSESSMENT — PAIN DESCRIPTION - DESCRIPTORS
DESCRIPTORS: BURNING;SORE;CRAMPING
DESCRIPTORS: CRAMPING;PRESSURE
DESCRIPTORS: BURNING;CRAMPING;SORE
DESCRIPTORS: CRAMPING;PRESSURE
DESCRIPTORS: BURNING;SHARP;TENDER

## 2024-08-14 ASSESSMENT — PAIN DESCRIPTION - LOCATION
LOCATION: ABDOMEN

## 2024-08-14 ASSESSMENT — PAIN DESCRIPTION - PAIN TYPE
TYPE: SURGICAL PAIN

## 2024-08-14 ASSESSMENT — COPD QUESTIONNAIRES: CAT_SEVERITY: MILD

## 2024-08-14 ASSESSMENT — PAIN - FUNCTIONAL ASSESSMENT
PAIN_FUNCTIONAL_ASSESSMENT: ACTIVITIES ARE NOT PREVENTED
PAIN_FUNCTIONAL_ASSESSMENT: 0-10
PAIN_FUNCTIONAL_ASSESSMENT: ACTIVITIES ARE NOT PREVENTED
PAIN_FUNCTIONAL_ASSESSMENT: ACTIVITIES ARE NOT PREVENTED

## 2024-08-14 ASSESSMENT — PAIN DESCRIPTION - RADICULAR PAIN: RADICULAR_PAIN: MODERATE

## 2024-08-14 NOTE — OP NOTE
[address]  [address]                            OPERATIVE REPORT      PATIENT NAME: SUSAN BYNUM                 : 1965  MED REC NO: [id number]                       ROOM: OR  ACCOUNT NO: [id number]                       ADMIT DATE: 2024  PROVIDER: Randy Wiggins MD    DATE OF SERVICE:  2024    PREOPERATIVE DIAGNOSES:  Incarcerated ventral hernia.    POSTOPERATIVE DIAGNOSES:  Incarcerated ventral hernia.    PROCEDURES PERFORMED:  Robotic repair of incarcerated ventral hernia measuring around 1 cm in size with placement of mesh.    SURGEON:  Randy Wiggins MD    ASSISTANT:  Nataliya Hutchins SA    ANESTHESIA:  General.    ESTIMATED BLOOD LOSS:  Minimal.    SPECIMENS REMOVED:  None.     COMPLICATIONS:  None.    IMPLANTS:  Ventralight Bard mesh 11.4 cm round.    DESCRIPTION OF PROCEDURE:  The patient was brought to the operating room.  Anesthesia was induced.  Scrubbing and draping of the abdomen was done in usual manner.  A time-out was performed.  A skin incision in the left upper quadrant was performed.  Veress needle was inserted.  Saline drop test was performed.  Abdomen was insufflated.  8 mm port was inserted and abdomen was explored.  There was no injury from the Veress needle or port placement.  Under direct visualization, 2 other 8 mm ports were placed on the left side of the abdominal wall and tap block was performed bilaterally and the robot was docked.  There was an incarcerated ventral hernia located above the umbilicus with the preperitoneal fat inside of it.  This was dissected completely and reduced.  The defect was very small, measuring around 1 to 1.5 cm in size.  It was closed with a #0 V-Loc suture in a running fashion in 2 layers and then we placed an 11.4 cm round Ventralight Bard mesh with the rough side towards the abdominal wall and the smooth side towards the bowel and this was fixed

## 2024-08-14 NOTE — DISCHARGE INSTRUCTIONS
Discharge Instructions Following Surgery    Patient: Lorelei Sierra MRN: [id number]  SSN: [id number]    YOB: 1965  Age: 58 y.o.  Sex: female      Activity  As tolerated, walking encourage, stairs are okay.  Avoid strenuous activities - no lifting anything heavier than 15 pounds till seen in the clinic.  You may shower at home after 24 hours.  For the first 24 hours: do not Drive, Drink alcoholic beverages, or make important decisions.  Be aware of dizziness following anesthesia and while taking pain medication.    Diet and Medications  Regular diet after nausea from the anesthetic has passed.  Take pain medication as directed by your doctor.  Call your doctor if pain is NOT relieved by medication.    Wound and Dressing Care  There is glue on the wounds. No need for any dressing care.   Apply ice packs to the area of the surgery for the first 1 to 2 days  Apply warm compresses after 2 days for pain relieve if needed    Call your doctor if  Excessive bleeding that does not stop after holding pressure over the area.  Temperature of 101 degrees F or above.  Redness,excessive swelling or bruising, and/or green or yellow, smelly discharge from incision.  If nausea and vomiting continues.    Follow-Up    Call the office of Dr. Randy Wiggins at [phone] to make your follow-up appointment.    Dr. Wiggins's cell phone number is [phone]. Please call me if you have any concerns or questions.

## 2024-08-14 NOTE — PERIOP NOTE
Pt's BP in PreOp=136/79, PreOp HR= 55; Pt's Current BP= 90/54, HR=43, Dr. Sansd with anesthesia aware.    Per Dr. Sands, give 500 mL bolus of IVF, continue to monitor pt.

## 2024-08-14 NOTE — ANESTHESIA PRE PROCEDURE
Department of Anesthesiology  Preprocedure Note       Name:  Lorelei Sierra   Age:  58 y.o.  :  1965                                          MRN:  [id number]         Date:  2024      Surgeon: Surgeon(s):  Randy Wiggins MD    Procedure: Procedure(s):  ROBOTIC INCARCERATED VENTRAL HERNIA REPAIR WITH PLACEMENT OF MESH    Medications prior to admission:   Prior to Admission medications    Medication Sig Start Date End Date Taking? Authorizing Provider   pravastatin (PRAVACHOL) 80 MG tablet Take 1 tablet by mouth once daily 24  Yes Twin Singh MD   losartan (COZAAR) 25 MG tablet Take 1 tablet by mouth daily  Patient taking differently: Take 1 tablet by mouth every evening 23  Yes Twin Singh MD   diclofenac sodium (VOLTAREN) 1 % GEL Apply 4 g topically 4 times daily as needed for Pain 23  Yes Twin Singh MD   albuterol sulfate HFA (PROVENTIL;VENTOLIN;PROAIR) 108 (90 Base) MCG/ACT inhaler Inhale 2 puffs into the lungs every 4 hours as needed 10/6/22  Yes Automatic Reconciliation, Ar   gabapentin (NEURONTIN) 300 MG capsule TAKE 1 CAPSULE BY MOUTH THREE TIMES DAILY (MAX  DAILY  AMOUNT  3  CAPSULES)  Patient taking differently: Take 1 capsule by mouth nightly as needed. 7/10/24 11/7/24  Twin Singh MD   albuterol (PROVENTIL) (2.5 MG/3ML) 0.083% nebulizer solution Take 3 mLs by nebulization every 4 hours as needed for Wheezing    Provider, MD Derrick       Current medications:    Current Facility-Administered Medications   Medication Dose Route Frequency Provider Last Rate Last Admin    lidocaine PF 1 % injection 1 mL  1 mL IntraDERmal Once PRN Gian Brock APRN - CRNA        lactated ringers IV soln infusion   IntraVENous Continuous Gian Brock APRN - CRNA 125 mL/hr at 24 0640 New Bag at 24 0640    sodium chloride flush 0.9 % injection 5-40 mL  5-40 mL IntraVENous PRN Gian Brock APRN - CRNA        ceFAZolin (ANCEF) 2,000 mg in sterile water 20

## 2024-08-14 NOTE — PERIOP NOTE
Patient /Family /Designee has been informed that [facility] is not responsible for patient belongings per policy and the signed [facility] Patient Agreement document.  Personal items should be sent home or checked in with security.  Patient /Family /Designee selected the following action:                            [x]  Send personal items home with a family member or friend                                                 []  Check in personal items with security, excluding clothing                            []  Maintain personal items at the bedside, against recommendation                                 by Roberto Carlos Galarza [facility]

## 2024-08-14 NOTE — ANESTHESIA POSTPROCEDURE EVALUATION
Department of Anesthesiology  Postprocedure Note    Patient: Lorelei Sierra  MRN: [id number]  YOB: 1965  Date of evaluation: 8/14/2024    Procedure Summary       Date: 08/14/24 Room / Location: [facility] MAIN 04 / [facility] MAIN OR    Anesthesia Start: 0726 Anesthesia Stop: 0818    Procedure: ROBOTIC INCARCERATED VENTRAL HERNIA REPAIR WITH PLACEMENT OF MESH (Abdomen) Diagnosis:       Incarcerated ventral hernia      (Incarcerated ventral hernia [K43.6])    Surgeons: Randy Wiggins MD Responsible Provider: Twin Sands MD    Anesthesia Type: General ASA Status: 3            Anesthesia Type: General    Krystal Phase I: Krystal Score: 10    Krystal Phase II: Krystal Score: 10    Anesthesia Post Evaluation    Patient location during evaluation: PACU  Patient participation: complete - patient participated  Level of consciousness: awake  Airway patency: patent  Nausea & Vomiting: no nausea  Cardiovascular status: blood pressure returned to baseline  Respiratory status: acceptable  Hydration status: euvolemic  Pain management: adequate    No notable events documented.

## 2024-08-14 NOTE — PROGRESS NOTES
Update History & Physical    The patient's History and Physical was reviewed with the patient and I examined the patient. There was no change. The surgical site was confirmed by the patient and me.       Plan: The risks, benefits, expected outcome, and alternative to the recommended procedure have been discussed with the patient. Patient understands and wants to proceed with the procedure. Will proceed with robotic incarcerated ventral hernia repair with placement of mesh.    Electronically signed by Randy Wiggins MD on 8/14/2024 at 6:45 AM

## 2024-08-29 ENCOUNTER — OFFICE VISIT (OUTPATIENT)
Age: 59
End: 2024-08-29
Payer: MEDICARE

## 2024-08-29 VITALS
HEIGHT: 65 IN | OXYGEN SATURATION: 97 % | BODY MASS INDEX: 26.66 KG/M2 | SYSTOLIC BLOOD PRESSURE: 137 MMHG | TEMPERATURE: 98.6 F | DIASTOLIC BLOOD PRESSURE: 84 MMHG | WEIGHT: 160 LBS | HEART RATE: 75 BPM

## 2024-08-29 DIAGNOSIS — J44.1 CHRONIC OBSTRUCTIVE PULMONARY DISEASE WITH ACUTE EXACERBATION (HCC): ICD-10-CM

## 2024-08-29 DIAGNOSIS — Z09 POSTOPERATIVE FOLLOW-UP: ICD-10-CM

## 2024-08-29 DIAGNOSIS — I25.10 CORONARY ARTERY DISEASE INVOLVING NATIVE CORONARY ARTERY OF NATIVE HEART WITHOUT ANGINA PECTORIS: ICD-10-CM

## 2024-08-29 DIAGNOSIS — K43.6 INCARCERATED VENTRAL HERNIA: Primary | ICD-10-CM

## 2024-08-29 PROCEDURE — 3075F SYST BP GE 130 - 139MM HG: CPT | Performed by: SURGERY

## 2024-08-29 PROCEDURE — 3079F DIAST BP 80-89 MM HG: CPT | Performed by: SURGERY

## 2024-08-29 PROCEDURE — G8419 CALC BMI OUT NRM PARAM NOF/U: HCPCS | Performed by: SURGERY

## 2024-08-29 PROCEDURE — 3023F SPIROM DOC REV: CPT | Performed by: SURGERY

## 2024-08-29 PROCEDURE — G8427 DOCREV CUR MEDS BY ELIG CLIN: HCPCS | Performed by: SURGERY

## 2024-08-29 PROCEDURE — 4004F PT TOBACCO SCREEN RCVD TLK: CPT | Performed by: SURGERY

## 2024-08-29 PROCEDURE — 3017F COLORECTAL CA SCREEN DOC REV: CPT | Performed by: SURGERY

## 2024-08-29 PROCEDURE — 99213 OFFICE O/P EST LOW 20 MIN: CPT | Performed by: SURGERY

## 2024-08-29 NOTE — PROGRESS NOTES
General Surgery Consult      Lorelei Sierra  Admit date: (Not on file)    MRN: [id number]     : 1965     Age: 58 y.o.        Attending Physician: Randy Wiggins MD, [facility]      History of Present Illness:     Lorelei Sierra is a 58 y.o. female who is here for follow-up on her repair of incarcerated ventral hernia.  She states she had constipation for 3 to 4 days after the surgery but then she is having normal bowel movement.  She also had some pain but it is getting better is better.  She currently denies any significant abdominal pain and she is tolerating regular diet.     Patient Active Problem List    Diagnosis Date Noted    Incarcerated ventral hernia 2024    History of seizure 2024    Hx of adenomatous colonic polyps 07/10/2024    Primary hypertension 2022    Hypercholesterolemia 2022    Continuous dependence on cigarette smoking 2022    Lumbar disc disease with radiculopathy 2022    Neuropathy 2022    Tobacco abuse 2019    Chronic obstructive pulmonary disease with acute exacerbation (HCC) 2019    Neck pain 2019    Thyroid nodule 2019    Simple chronic bronchitis (HCC) 2019    Sciatica of right side 2019    Coronary artery disease involving native heart without angina pectoris 2017     Past Medical History:   Diagnosis Date    Autoimmune disease (HCC)     Celiac disease     Chronic obstructive pulmonary disease (HCC)     Colon polyps     Endocrine disease     Hypertension     Seizures (HCC)     Last seizure  . No current medication for seizures    Skin cancer     Arms      Past Surgical History:   Procedure Laterality Date    APPENDECTOMY       SECTION      COLONOSCOPY N/A 2017    COLONOSCOPY, SURVEILLANCE by Kilo Fernandez MD multiple tubular adenomas, 3-year follow-up    COLONOSCOPY      HYSTERECTOMY (CERVIX STATUS UNKNOWN)      LAPAROTOMY      REZA AND BSO (CERVIX REMOVED)      TONSILLECTOMY

## 2024-08-29 NOTE — PROGRESS NOTES
Lorelei Sierra is a 58 y.o. female (: 1965) presenting to address:    Chief Complaint   Patient presents with    Post-Op Check     Repair of incarcerated ventral hernia with 11.4 cm rd mesh 24       Medication list and allergies have been reviewed with Lorelei Sierra and updated as of today's date.     I have gone over all Medical, Surgical and Social History with Lorelei Sierra and updated/added the information accordingly.       1. Have you been to the ER, Urgent Care or Hospitalized since your last visit? No          2. Have you followed up with your PCP or any other Physicians since your procedure/ last office visit?   No

## 2024-09-16 ENCOUNTER — OFFICE VISIT (OUTPATIENT)
Age: 59
End: 2024-09-16
Payer: MEDICARE

## 2024-09-16 VITALS
WEIGHT: 164 LBS | HEIGHT: 65 IN | OXYGEN SATURATION: 98 % | TEMPERATURE: 97.7 F | BODY MASS INDEX: 27.32 KG/M2 | HEART RATE: 68 BPM

## 2024-09-16 DIAGNOSIS — M25.551 BILATERAL HIP PAIN: ICD-10-CM

## 2024-09-16 DIAGNOSIS — M54.42 CHRONIC BILATERAL LOW BACK PAIN WITH LEFT-SIDED SCIATICA: Primary | ICD-10-CM

## 2024-09-16 DIAGNOSIS — G89.29 CHRONIC BILATERAL LOW BACK PAIN WITH LEFT-SIDED SCIATICA: Primary | ICD-10-CM

## 2024-09-16 DIAGNOSIS — M25.552 BILATERAL HIP PAIN: ICD-10-CM

## 2024-09-16 PROCEDURE — G8427 DOCREV CUR MEDS BY ELIG CLIN: HCPCS | Performed by: PHYSICAL MEDICINE & REHABILITATION

## 2024-09-16 PROCEDURE — 72170 X-RAY EXAM OF PELVIS: CPT | Performed by: PHYSICAL MEDICINE & REHABILITATION

## 2024-09-16 PROCEDURE — 3017F COLORECTAL CA SCREEN DOC REV: CPT | Performed by: PHYSICAL MEDICINE & REHABILITATION

## 2024-09-16 PROCEDURE — G8419 CALC BMI OUT NRM PARAM NOF/U: HCPCS | Performed by: PHYSICAL MEDICINE & REHABILITATION

## 2024-09-16 PROCEDURE — 4004F PT TOBACCO SCREEN RCVD TLK: CPT | Performed by: PHYSICAL MEDICINE & REHABILITATION

## 2024-09-16 PROCEDURE — 99204 OFFICE O/P NEW MOD 45 MIN: CPT | Performed by: PHYSICAL MEDICINE & REHABILITATION

## 2024-09-16 PROCEDURE — 72100 X-RAY EXAM L-S SPINE 2/3 VWS: CPT | Performed by: PHYSICAL MEDICINE & REHABILITATION

## 2024-09-16 RX ORDER — BUPROPION HYDROCHLORIDE 100 MG/1
100 TABLET, EXTENDED RELEASE ORAL DAILY
COMMUNITY
Start: 2024-08-23

## 2024-09-16 RX ORDER — BUDESONIDE, GLYCOPYRROLATE, AND FORMOTEROL FUMARATE 160; 9; 4.8 UG/1; UG/1; UG/1
1 AEROSOL, METERED RESPIRATORY (INHALATION) DAILY
COMMUNITY
Start: 2024-08-23

## 2024-09-16 RX ORDER — PREGABALIN 50 MG/1
50 CAPSULE ORAL 2 TIMES DAILY
Qty: 60 CAPSULE | Refills: 1 | Status: SHIPPED | OUTPATIENT
Start: 2024-09-16 | End: 2024-10-16

## 2024-10-28 ENCOUNTER — TELEPHONE (OUTPATIENT)
Dept: FAMILY MEDICINE CLINIC | Facility: CLINIC | Age: 59
End: 2024-10-28

## 2024-11-13 ASSESSMENT — ENCOUNTER SYMPTOMS
SHORTNESS OF BREATH: 0
ABDOMINAL PAIN: 0
WHEEZING: 0
CONSTIPATION: 0
BLOOD IN STOOL: 0
SORE THROAT: 0
DIARRHEA: 0
ANAL BLEEDING: 0
VOMITING: 0
NAUSEA: 0
COUGH: 0

## 2024-11-13 NOTE — PROGRESS NOTES
HISTORY OF PRESENT ILLNESS  Lorelei Sierra  is a 59 y.o. y.o. female    She presents for follow-up with a history of primary hypertension,coronary artery disease, hyperlipidemia, COPD, continuous dependence on cigarette smoking, a reported  history of lumbar disc disease with radiculopathy and peripheral neuropathy and tubular adenomas of the colon as well as for a Medicare wellness evaluation.    She had been seeing Dr. Albarran who has left [facility] and has been contacted about scheduling with a another physical medicine specialist but apparently was out of town when she received a call and did not successfully arrange an appointment.  She continues to report severe pain in the left lumbar area radiating down her left leg.  She has not been unable to tolerate gabapentin or pregabalin.    She reports that she caught her foot under her chair as she was falling about 6 weeks ago which caused hyperextension of her toes and she continues to have discomfort in that foot when she wear shoes.        Mr#: [id number]      Past Medical History:   Diagnosis Date    Autoimmune disease (HCC)     Celiac disease     Chronic obstructive pulmonary disease (HCC)     Colon polyps     Endocrine disease     Hypertension     Seizures (HCC)     Last seizure  . No current medication for seizures    Skin cancer     Arms       Past Surgical History:   Procedure Laterality Date    APPENDECTOMY       SECTION      COLONOSCOPY N/A 2017    COLONOSCOPY, SURVEILLANCE by Kilo Fernandez MD multiple tubular adenomas, 3-year follow-up    COLONOSCOPY      HYSTERECTOMY (CERVIX STATUS UNKNOWN)      LAPAROTOMY      REZA AND BSO (CERVIX REMOVED)      TONSILLECTOMY      UPPER GASTROINTESTINAL ENDOSCOPY      VENTRAL HERNIA REPAIR N/A 2024    ROBOTIC INCARCERATED VENTRAL HERNIA REPAIR WITH PLACEMENT OF MESH performed by Randy Wiggins MD at [facility] MAIN OR       Family History   Problem Relation Age of Onset    Diabetes Mother

## 2024-11-14 ENCOUNTER — OFFICE VISIT (OUTPATIENT)
Dept: FAMILY MEDICINE CLINIC | Facility: CLINIC | Age: 59
End: 2024-11-14

## 2024-11-14 VITALS
HEIGHT: 65 IN | OXYGEN SATURATION: 97 % | WEIGHT: 165 LBS | BODY MASS INDEX: 27.49 KG/M2 | SYSTOLIC BLOOD PRESSURE: 120 MMHG | TEMPERATURE: 98 F | DIASTOLIC BLOOD PRESSURE: 90 MMHG | HEART RATE: 66 BPM | RESPIRATION RATE: 16 BRPM

## 2024-11-14 DIAGNOSIS — E78.00 HYPERCHOLESTEROLEMIA: ICD-10-CM

## 2024-11-14 DIAGNOSIS — F17.210 CONTINUOUS DEPENDENCE ON CIGARETTE SMOKING: ICD-10-CM

## 2024-11-14 DIAGNOSIS — M79.672 LEFT FOOT PAIN: ICD-10-CM

## 2024-11-14 DIAGNOSIS — Z00.00 MEDICARE ANNUAL WELLNESS VISIT, SUBSEQUENT: ICD-10-CM

## 2024-11-14 DIAGNOSIS — I10 PRIMARY HYPERTENSION: Primary | ICD-10-CM

## 2024-11-14 DIAGNOSIS — M25.562 LEFT KNEE PAIN, UNSPECIFIED CHRONICITY: ICD-10-CM

## 2024-11-14 DIAGNOSIS — Z87.891 PERSONAL HISTORY OF TOBACCO USE: ICD-10-CM

## 2024-11-14 DIAGNOSIS — I25.10 CORONARY ARTERY DISEASE INVOLVING NATIVE CORONARY ARTERY OF NATIVE HEART WITHOUT ANGINA PECTORIS: ICD-10-CM

## 2024-11-14 DIAGNOSIS — Z86.0101 HX OF ADENOMATOUS COLONIC POLYPS: ICD-10-CM

## 2024-11-14 DIAGNOSIS — M51.16 LUMBAR DISC DISEASE WITH RADICULOPATHY: ICD-10-CM

## 2024-11-14 DIAGNOSIS — J44.9 CHRONIC OBSTRUCTIVE PULMONARY DISEASE, UNSPECIFIED COPD TYPE (HCC): ICD-10-CM

## 2024-11-14 RX ORDER — PRAVASTATIN SODIUM 80 MG/1
80 TABLET ORAL DAILY
Qty: 90 TABLET | Refills: 3 | Status: SHIPPED | OUTPATIENT
Start: 2024-11-14

## 2024-11-14 RX ORDER — LOSARTAN POTASSIUM 25 MG/1
25 TABLET ORAL EVERY EVENING
Qty: 90 TABLET | Refills: 3 | Status: SHIPPED | OUTPATIENT
Start: 2024-11-14

## 2024-11-14 SDOH — ECONOMIC STABILITY: FOOD INSECURITY: WITHIN THE PAST 12 MONTHS, THE FOOD YOU BOUGHT JUST DIDN'T LAST AND YOU DIDN'T HAVE MONEY TO GET MORE.: NEVER TRUE

## 2024-11-14 SDOH — ECONOMIC STABILITY: INCOME INSECURITY: HOW HARD IS IT FOR YOU TO PAY FOR THE VERY BASICS LIKE FOOD, HOUSING, MEDICAL CARE, AND HEATING?: VERY HARD

## 2024-11-14 SDOH — ECONOMIC STABILITY: FOOD INSECURITY: WITHIN THE PAST 12 MONTHS, YOU WORRIED THAT YOUR FOOD WOULD RUN OUT BEFORE YOU GOT MONEY TO BUY MORE.: SOMETIMES TRUE

## 2024-11-14 ASSESSMENT — PATIENT HEALTH QUESTIONNAIRE - PHQ9
SUM OF ALL RESPONSES TO PHQ9 QUESTIONS 1 & 2: 0
SUM OF ALL RESPONSES TO PHQ QUESTIONS 1-9: 0
1. LITTLE INTEREST OR PLEASURE IN DOING THINGS: NOT AT ALL
SUM OF ALL RESPONSES TO PHQ QUESTIONS 1-9: 0
2. FEELING DOWN, DEPRESSED OR HOPELESS: NOT AT ALL

## 2024-11-14 ASSESSMENT — ENCOUNTER SYMPTOMS
ROS SKIN COMMENTS: LICHEN PLANUS
BACK PAIN: 1

## 2024-11-14 NOTE — PATIENT INSTRUCTIONS
Personalized preventative care plan/recommendations:  Complete and return advance directives form  Influenza immunization declined  COVID-19 immunization booster, Shingrix immunization series-available at the pharmacy  Mammogram ordered July 2024-scheduled in January  Low-dose chest CT scan for lung cancer screening ordered by pulmonary medicine consultant  Colonoscopy overdue since July 2020-patient will reschedule  Medicare wellness evaluation due November 2025    Current Status:  Hypertension with mildly elevated diastolic pressure-reports anxiety associated with driving here from [place].  History of coronary artery disease without current symptoms of cardiac ischemia  Status of hyperlipidemia to be determined pending lab results  COPD symptoms stable, followed by pulmonary medicine  No change in continuous dependence on cigarette smoking-reports having cut down on the amount of smoking  Symptoms associated with lumbar disc disease followed by physical medicine  History of colonic tubular adenomas markedly overdue for colonoscopy surveillance-cancelled by consultant provider, patient will reschedule    Plan:  Physical medicine referral  Lab studies ordered, further disposition pending lab results if indicated  Continue losartan 25 mg daily  Continue Breztri and albuterol inhalers prescribed by pulmonary medicine  Continue pravastatin 80 mg daily pending lab results  Return for follow-up in 1 year or sooner with any problems  Please always arrive at least 15 minutes before your scheduled appointment time.   Preventing Falls: Care Instructions  Injuries and health problems such as trouble walking or poor eyesight can increase your risk of falling. So can some medicines. But there are things you can do to help prevent falls. You can exercise to get stronger. You can also arrange your home to make it safer.    Talk to your doctor about the medicines you take. Ask if any of them increase the risk of falls and whether

## 2024-11-14 NOTE — PROGRESS NOTES
Lorelei Sierra is a 59 y.o. female (: 1965) presenting to address:    Chief Complaint   Patient presents with    Foot Pain     Left foot pain x 6 weeks ago After falling and foot got caught under a egg chair.     Medicare AWV    Immunizations     Declined flu shot.        Vitals:    24 0921   BP: (!) 120/90   Pulse:    Resp:    Temp:    SpO2:        \"Have you been to the ER, urgent care clinic since your last visit?  Hospitalized since your last visit?\"    NO    “Have you seen or consulted any other health care providers outside of [facility] since your last visit?”    NO    “Have you had a colorectal cancer screening such as a colonoscopy/FIT/Cologuard?    NO    Date of last Colonoscopy: 2017  No cologuard on file  No FIT/FOBT on file   No flexible sigmoidoscopy on file        Have you had a mammogram?”   NO    Date of last Mammogram: 2023

## 2025-01-16 ENCOUNTER — HOSPITAL ENCOUNTER (OUTPATIENT)
Facility: HOSPITAL | Age: 60
Setting detail: SPECIMEN
Discharge: HOME | End: 2025-01-19
Payer: MEDICAID

## 2025-01-16 DIAGNOSIS — Z86.0101 HX OF ADENOMATOUS COLONIC POLYPS: ICD-10-CM

## 2025-01-16 DIAGNOSIS — I10 PRIMARY HYPERTENSION: ICD-10-CM

## 2025-01-16 DIAGNOSIS — E78.00 HYPERCHOLESTEROLEMIA: ICD-10-CM

## 2025-01-16 LAB
ALBUMIN SERPL-MCNC: 3.4 G/DL (ref 3.4–5)
ALBUMIN/GLOB SERPL: 1 (ref 0.8–1.7)
ALP SERPL-CCNC: 96 U/L (ref 45–117)
ALT SERPL-CCNC: 23 U/L (ref 13–56)
ANION GAP SERPL CALC-SCNC: 3 MMOL/L (ref 3–18)
APPEARANCE UR: CLEAR
AST SERPL-CCNC: 19 U/L (ref 10–38)
BASOPHILS # BLD: 0.1 K/UL (ref 0–0.1)
BASOPHILS NFR BLD: 1.3 % (ref 0–2)
BILIRUB SERPL-MCNC: 0.6 MG/DL (ref 0.2–1)
BILIRUB UR QL: NEGATIVE
BUN SERPL-MCNC: 14 MG/DL (ref 7–18)
BUN/CREAT SERPL: 18 (ref 12–20)
CALCIUM SERPL-MCNC: 9 MG/DL (ref 8.5–10.1)
CHLORIDE SERPL-SCNC: 108 MMOL/L (ref 100–111)
CHOLEST SERPL-MCNC: 148 MG/DL
CO2 SERPL-SCNC: 29 MMOL/L (ref 21–32)
COLOR UR: YELLOW
CREAT SERPL-MCNC: 0.79 MG/DL (ref 0.6–1.3)
DIFFERENTIAL METHOD BLD: ABNORMAL
EOSINOPHIL # BLD: 0.22 K/UL (ref 0–0.4)
EOSINOPHIL NFR BLD: 2.8 % (ref 0–5)
ERYTHROCYTE [DISTWIDTH] IN BLOOD BY AUTOMATED COUNT: 12.7 % (ref 11.6–14.5)
GLOBULIN SER CALC-MCNC: 3.5 G/DL (ref 2–4)
GLUCOSE SERPL-MCNC: 119 MG/DL (ref 74–99)
GLUCOSE UR STRIP.AUTO-MCNC: NEGATIVE MG/DL
HCT VFR BLD AUTO: 45.2 % (ref 35–45)
HDLC SERPL-MCNC: 40 MG/DL (ref 40–60)
HDLC SERPL: 3.7 (ref 0–5)
HGB BLD-MCNC: 14.6 G/DL (ref 12–16)
HGB UR QL STRIP: NEGATIVE
IMM GRANULOCYTES # BLD AUTO: 0.06 K/UL (ref 0–0.04)
IMM GRANULOCYTES NFR BLD AUTO: 0.8 % (ref 0–0.5)
KETONES UR QL STRIP.AUTO: NEGATIVE MG/DL
LDLC SERPL CALC-MCNC: 90.8 MG/DL (ref 0–100)
LEUKOCYTE ESTERASE UR QL STRIP.AUTO: NEGATIVE
LIPID PANEL: NORMAL
LYMPHOCYTES # BLD: 2.53 K/UL (ref 0.9–3.6)
LYMPHOCYTES NFR BLD: 31.9 % (ref 21–52)
MCH RBC QN AUTO: 30.5 PG (ref 24–34)
MCHC RBC AUTO-ENTMCNC: 32.3 G/DL (ref 31–37)
MCV RBC AUTO: 94.4 FL (ref 78–100)
MONOCYTES # BLD: 0.68 K/UL (ref 0.05–1.2)
MONOCYTES NFR BLD: 8.6 % (ref 3–10)
NEUTS SEG # BLD: 4.35 K/UL (ref 1.8–8)
NEUTS SEG NFR BLD: 54.6 % (ref 40–73)
NITRITE UR QL STRIP.AUTO: NEGATIVE
NRBC # BLD: 0 K/UL (ref 0–0.01)
NRBC BLD-RTO: 0 PER 100 WBC
PH UR STRIP: 5.5 (ref 5–8)
PLATELET # BLD AUTO: 408 K/UL (ref 135–420)
PMV BLD AUTO: 10.8 FL (ref 9.2–11.8)
POTASSIUM SERPL-SCNC: 4 MMOL/L (ref 3.5–5.5)
PROT SERPL-MCNC: 6.9 G/DL (ref 6.4–8.2)
PROT UR STRIP-MCNC: NEGATIVE MG/DL
RBC # BLD AUTO: 4.79 M/UL (ref 4.2–5.3)
SODIUM SERPL-SCNC: 140 MMOL/L (ref 136–145)
SP GR UR REFRACTOMETRY: 1.02 (ref 1–1.03)
TRIGL SERPL-MCNC: 86 MG/DL
TSH SERPL DL<=0.05 MIU/L-ACNC: 1.51 UIU/ML (ref 0.36–3.74)
UROBILINOGEN UR QL STRIP.AUTO: 0.2 EU/DL (ref 0.2–1)
VLDLC SERPL CALC-MCNC: 17.2 MG/DL
WBC # BLD AUTO: 7.9 K/UL (ref 4.6–13.2)

## 2025-01-16 PROCEDURE — 81003 URINALYSIS AUTO W/O SCOPE: CPT

## 2025-01-16 PROCEDURE — 84443 ASSAY THYROID STIM HORMONE: CPT

## 2025-01-16 PROCEDURE — 85025 COMPLETE CBC W/AUTO DIFF WBC: CPT

## 2025-01-16 PROCEDURE — 80061 LIPID PANEL: CPT

## 2025-01-16 PROCEDURE — 36415 COLL VENOUS BLD VENIPUNCTURE: CPT

## 2025-01-16 PROCEDURE — 80053 COMPREHEN METABOLIC PANEL: CPT

## 2025-01-19 DIAGNOSIS — E78.00 HYPERCHOLESTEROLEMIA: Primary | ICD-10-CM

## 2025-01-19 DIAGNOSIS — I25.10 CORONARY ARTERY DISEASE INVOLVING NATIVE CORONARY ARTERY OF NATIVE HEART WITHOUT ANGINA PECTORIS: ICD-10-CM

## 2025-01-19 RX ORDER — EZETIMIBE 10 MG/1
10 TABLET ORAL DAILY
Qty: 90 TABLET | Refills: 3 | Status: SHIPPED | OUTPATIENT
Start: 2025-01-19

## 2025-03-05 ENCOUNTER — HOSPITAL ENCOUNTER (OUTPATIENT)
Facility: HOSPITAL | Age: 60
Discharge: HOME | End: 2025-03-08
Attending: FAMILY MEDICINE
Payer: MEDICARE

## 2025-03-05 VITALS — HEIGHT: 65 IN | BODY MASS INDEX: 26.66 KG/M2 | WEIGHT: 160 LBS

## 2025-03-05 DIAGNOSIS — Z12.31 BREAST CANCER SCREENING BY MAMMOGRAM: ICD-10-CM

## 2025-03-05 PROCEDURE — 77063 BREAST TOMOSYNTHESIS BI: CPT

## 2025-03-07 ENCOUNTER — TELEPHONE (OUTPATIENT)
Dept: FAMILY MEDICINE CLINIC | Facility: CLINIC | Age: 60
End: 2025-03-07

## 2025-03-07 DIAGNOSIS — E04.1 THYROID NODULE GREATER THAN OR EQUAL TO 1.5 CM IN DIAMETER INCIDENTALLY NOTED ON IMAGING STUDY: Primary | ICD-10-CM

## 2025-03-07 NOTE — TELEPHONE ENCOUNTER
The low-dose chest CT from 3/6/2025 showed no evidence of findings to suggest developing lung cancer but did incidentally show a left thyroid lobe nodule.  The radiologist has recommended a thyroid ultrasound for further evaluation.  An ultrasound has been ordered.  She should expect to receive a call from Daisy about scheduling the ultrasound.

## 2025-03-10 ENCOUNTER — RESULTS FOLLOW-UP (OUTPATIENT)
Facility: HOSPITAL | Age: 60
End: 2025-03-10

## 2025-03-12 ENCOUNTER — HOSPITAL ENCOUNTER (OUTPATIENT)
Facility: HOSPITAL | Age: 60
Setting detail: SPECIMEN
Discharge: HOME | End: 2025-03-15
Payer: MEDICARE

## 2025-03-12 DIAGNOSIS — E78.00 HYPERCHOLESTEROLEMIA: ICD-10-CM

## 2025-03-12 DIAGNOSIS — I25.10 CORONARY ARTERY DISEASE INVOLVING NATIVE CORONARY ARTERY OF NATIVE HEART WITHOUT ANGINA PECTORIS: ICD-10-CM

## 2025-03-12 LAB
CHOLEST SERPL-MCNC: 111 MG/DL
HDLC SERPL-MCNC: 47 MG/DL (ref 40–60)
HDLC SERPL: 2.4 (ref 0–5)
LDLC SERPL CALC-MCNC: 51.2 MG/DL (ref 0–100)
LIPID PANEL: NORMAL
TRIGL SERPL-MCNC: 64 MG/DL
VLDLC SERPL CALC-MCNC: 12.8 MG/DL

## 2025-03-12 PROCEDURE — 80061 LIPID PANEL: CPT

## 2025-03-12 PROCEDURE — 36415 COLL VENOUS BLD VENIPUNCTURE: CPT

## 2025-03-26 ENCOUNTER — RESULTS FOLLOW-UP (OUTPATIENT)
Dept: FAMILY MEDICINE CLINIC | Facility: CLINIC | Age: 60
End: 2025-03-26

## 2025-04-11 ENCOUNTER — TELEPHONE (OUTPATIENT)
Dept: FAMILY MEDICINE CLINIC | Facility: CLINIC | Age: 60
End: 2025-04-11

## 2025-04-11 DIAGNOSIS — E04.1 LEFT THYROID NODULE: Primary | ICD-10-CM

## 2025-04-11 NOTE — TELEPHONE ENCOUNTER
Please advise that the thyroid ultrasound from 4/8/2025 showed no significant abnormalities except for a 2.4 cm nodule in the left lobe of the thyroid.  Because of the size and characteristics of the nodule a fine-needle aspiration is recommended to make sure that this is a benign lesion.  An endocrinology referral has been generated for additional evaluation.

## 2025-04-11 NOTE — TELEPHONE ENCOUNTER
Since this thyroid nodule is a relatively recent finding I think it would be best if she at least saw the endocrinologist to get an opinion about whether or not any further intervention is indicated.

## 2025-04-11 NOTE — TELEPHONE ENCOUNTER
I called patient and notified her of results she had a biopsy in the left lobe of thyroid in 2019 at [facility] does she have to do this again.

## (undated) DEVICE — ELECTRO LUBE IS A SINGLE PATIENT USE DEVICE THAT IS INTENDED TO BE USED ON ELECTROSURGICAL ELECTRODES TO REDUCE STICKING.: Brand: KEY SURGICAL ELECTRO LUBE

## (undated) DEVICE — SYRINGE MED 30ML STD CLR PLAS LUERLOCK TIP N CTRL DISP

## (undated) DEVICE — LIQUIBAND RAPID ADHESIVE 36/CS 0.8ML: Brand: MEDLINE

## (undated) DEVICE — ARM DRAPE

## (undated) DEVICE — STERILE POLYISOPRENE POWDER-FREE SURGICAL GLOVES: Brand: PROTEXIS

## (undated) DEVICE — SUTURE ABSRB L6IN L37MM 0 GS-21 GRN 1/2 CIR TAPR PNT NDL VLOCL0306

## (undated) DEVICE — NEEDLE SYRINGE 18GA L1.5IN RED PLAS HUB S STL BLNT FILL W/O

## (undated) DEVICE — SUTURE VICRYL SZ 2-0 L27IN ABSRB UD L26MM SH 1/2 CIR J417H

## (undated) DEVICE — SUTURE MONOCRYL SZ 4-0 L18IN ABSRB UD L19MM PS-2 3/8 CIR PRIM Y496G

## (undated) DEVICE — SUTURE ABSRB L12IN L12MM SZ 2-0 GS-22 VLT GLYCOLIDE VLOCM2115

## (undated) DEVICE — APPLICATOR MEDICATED 26 CC SOLUTION HI LT ORNG CHLORAPREP

## (undated) DEVICE — ELECTRODE PT RET AD L9FT HI MOIST COND ADH HYDRGEL CORDED

## (undated) DEVICE — TIP COVER ACCESSORY

## (undated) DEVICE — SOLUTION IRRIG 1000ML 0.9% SOD CHL USP POUR PLAS BTL

## (undated) DEVICE — SEAL

## (undated) DEVICE — BLADELESS OBTURATOR: Brand: WECK VISTA

## (undated) DEVICE — COLUMN DRAPE

## (undated) DEVICE — DRAPE TOWEL: Brand: CONVERTORS

## (undated) DEVICE — Device